# Patient Record
Sex: FEMALE | Race: WHITE | Employment: UNEMPLOYED | URBAN - METROPOLITAN AREA
[De-identification: names, ages, dates, MRNs, and addresses within clinical notes are randomized per-mention and may not be internally consistent; named-entity substitution may affect disease eponyms.]

---

## 2018-05-01 LAB
ANTIBODY SCREEN, EXTERNAL: NEGATIVE
CHLAMYDIA, EXTERNAL: NEGATIVE
HBSAG, EXTERNAL: NEGATIVE
HCT, EXTERNAL: 38.7
HGB, EXTERNAL: 12.7
HIV, EXTERNAL: NEGATIVE
N. GONORRHEA, EXTERNAL: NEGATIVE
PLATELET CNT,   EXTERNAL: 275
RUBELLA, EXTERNAL: NORMAL
T. PALLIDUM, EXTERNAL: NEGATIVE
TYPE, ABO & RH, EXTERNAL: NORMAL
URINALYSIS, EXTERNAL: NEGATIVE

## 2018-05-29 ENCOUNTER — DOCUMENTATION ONLY (OUTPATIENT)
Dept: OBGYN CLINIC | Age: 23
End: 2018-05-29

## 2018-05-29 ENCOUNTER — OFFICE VISIT (OUTPATIENT)
Dept: OBGYN CLINIC | Age: 23
End: 2018-05-29

## 2018-05-29 VITALS
BODY MASS INDEX: 21.16 KG/M2 | SYSTOLIC BLOOD PRESSURE: 118 MMHG | WEIGHT: 134.8 LBS | RESPIRATION RATE: 16 BRPM | HEIGHT: 67 IN | DIASTOLIC BLOOD PRESSURE: 66 MMHG | HEART RATE: 97 BPM

## 2018-05-29 DIAGNOSIS — Z34.90 PREGNANCY, UNSPECIFIED GESTATIONAL AGE: Primary | ICD-10-CM

## 2018-05-29 DIAGNOSIS — N92.6 MISSED MENSES: ICD-10-CM

## 2018-05-29 LAB
PAP SMEAR, EXTERNAL: NORMAL
URINALYSIS, EXTERNAL: NEGATIVE

## 2018-05-29 NOTE — PROGRESS NOTES
Current pregnancy history:    Kenroy Villalobos is a ,  25 y.o. female [facility] Patient's last menstrual period was 2018. .  She presents for the evaluation of amenorrhea and a positive pregnancy test.  Had single OB visit at outside office. Records reviewed:  Single, EOB visit 18. LMP=3/4/18 -> SHENA=18 **  US (18) 7+4 -> SHENA=18     O NEGATIVE  Nl hgb frac (AA)  CF drawn but do not see results **  Vit D=10.3**  Declines genetic screening  Do not see pap   Do not see pelvic exam documented  [pt states did not have pelvic exam done]    LMP history:  The date of her LMP is  certain. Her last menstrual period was normal and lasted for 4 to 5 days. A urine pregnancy test was positive unknown exact date it was in April. She was not on the pill at conception. Stopped in October. First period off OCP was a little late (5-6wks), have been regular since then q4wks. Based on her LMP, her EDC is 19 and her EGA is 12 weeks,2  days. Her menstrual cycles are regular and occur approximately every 28 days  and range from 3 to 5 days. The last menses lasted  the usual number of days. Ultrasound data:  She had an  ultrasound done at outside office done 18 which revealed a viable brink pregnancy with a gestational age of 9 weeks and 4 days giving an [address] of 18. (records in media tab)    Pregnancy symptoms:    Since her LMP she has experienced  urinary frequency, breast tenderness, and nausea. She HAS been vomiting over the last few weeks. Associated signs and symptoms which she denies: dysuria, discharge, vaginal bleeding. She states she has lost 1-2 pounds over last few wks, hasn't had much appetite. Relevant past pregnancy history:   She has the following pregnancy history: Her last pregnancy was uncomplicated. She has no history of  delivery. Relevant past medical history:(relevant to this pregnancy): noncontributory.        Pap/Occupational history:  Last pap smear: last year Results: Normal      Her occupation is: [employer] . Substance history: negative for alcohol, tobacco and street drugs. Positive for nothing. Exposure history: There is/are no indoor cat/s in the home. The patient was instructed to not change the cat litter. She admits close contact with children on a regular basis. She has had chicken pox or the vaccine in the past.   Patient denies issues with domestic violence. Genetic Screening/Teratology Counseling: (Includes patient, baby's father, or anyone in either family with:)  3.  Patient's age >/= 28 at [facility]?-- no  .   2. Thalassemia ([facility] [place], [address], or  background): MCV<80?--no.     3.  Neural tube defect (meningomyelocele, spina bifida, anencephaly)?--no.   4.  Congenital heart defect?--no.  5.  Down syndrome?--no.   6.  Kenny-Sachs (Spiritism, [place])?--no.   7.  Canavan's Disease?--no.   8.  Familial Dysautonomia?--no.   9.  Sickle cell disease or trait ()? --no   The patient has not been tested for sickle trait  10. Hemophilia or other blood disorders?--no. 11.  Muscular dystrophy?--no. 12.  Cystic fibrosis?--no. 13.  Hart's Chorea?--no. 14.  Mental retardation/autism (if yes was person tested for Fragile X)?--no. 15.  Other inherited genetic or chromosomal disorder?--no. 12.  Maternal metabolic disorder (DM, PKU, etc)?--no. 17.  Patient or FOB with a child with a birth defect not listed above?--no.  17a. Patient or FOB with a birth defect themselves?--no. 18.  Recurrent pregnancy loss, or stillbirth?--no. 19.  Any medications since LMP other than prenatal vitamins (include vitamins, supplements, OTC meds, drugs, alcohol)?--no. 20.  Any other genetic/environmental exposure to discuss?--no. Pt is [place]. FOB is . Infection History:  1.   Lives with someone with TB or TB exposed?--no.   2.  Patient or partner has history of genital herpes?--no.  3.  Rash or viral illness since LMP?--no.    4.  History of STD (GC, CT, HPV, syphilis, HIV)? --no   5. Other: OTHER? Past Medical History:   Diagnosis Date    Vitamin D deficiency 2018     History reviewed. No pertinent surgical history. Social History     Occupational History    Not on file. Social History Main Topics    Smoking status: Never Smoker    Smokeless tobacco: Never Used    Alcohol use No    Drug use: No    Sexual activity: Yes     Partners: Male     Family History   Problem Relation Age of Onset    No Known Problems Mother     No Known Problems Father     Breast Cancer Maternal Grandmother      OB History    Para Term  AB Living   1 0       SAB TAB Ectopic Molar Multiple Live Births              # Outcome Date GA Lbr Pola/2nd Weight Sex Delivery Anes PTL Lv   1 Current                 No Known Allergies  Prior to Admission medications    Medication Sig Start Date End Date Taking? Authorizing Provider   PNV MR.Maria Fernanda/JACLYN fum/folic ac (PRENATAL PO) Take  by mouth.    Yes Historical Provider        Review of Systems: History obtained from the patient  Constitutional: negative for weight loss, fever, night sweats  HEENT: negative for hearing loss, earache, congestion, snoring, sore throat  CV: negative for chest pain, palpitations, edema  Resp: negative for cough, shortness of breath, wheezing  Breast: negative for breast lumps, nipple discharge, galactorrhea  GI: negative for change in bowel habits, abdominal pain, black or bloody stools  : negative for frequency, dysuria, hematuria, vaginal discharge  MSK: negative for back pain, joint pain, muscle pain  Skin: negative for itching, rash, hives  Neuro: negative for dizziness, headache, confusion, weakness  Psych: negative for anxiety, depression, change in mood  Heme/lymph: negative for bleeding, bruising, pallor    Objective:  Visit Vitals    /66    Pulse 97    Resp 16    Ht 5' 7\" (1.702 m)    Wt 134 lb 12.8 oz (61.1 kg)    LMP 03/04/2018    BMI 21.11 kg/m2       Physical Exam:     Constitutional  · Appearance: well-nourished, well developed, alert, in no acute distress    HENT  · Head  · Face: appears normal  · Eyes: appear normal  · Ears: normal  · Mouth: normal  · Lips: no lesions    Neck  · Inspection/Palpation: normal appearance, no masses or tenderness  · Lymph Nodes: no lymphadenopathy present  · Thyroid: gland size normal, nontender, no nodules or masses present on palpation    Chest  · Respiratory Effort: breathing unlabored  · Auscultation: normal breath sounds    Cardiovascular  · Heart:  · Auscultation: regular rate and rhythm without murmur    Breasts  · Inspection of Breasts: breasts symmetrical, no skin changes, no discharge present, nipple appearance normal, no skin retraction present  · Palpation of Breasts and Axillae: no masses present on palpation, no breast tenderness  · Axillary Lymph Nodes: no lymphadenopathy present    Gastrointestinal  · Abdominal Examination: abdomen non-tender to palpation, normal bowel sounds, no masses present  · Liver and spleen: no hepatomegaly present, spleen not palpable  · Hernias: no hernias identified    Genitourinary  · External Genitalia: normal appearance for age, no discharge present, no tenderness present, no inflammatory lesions present, no masses present, no atrophy present  · Vagina: normal vaginal vault without central or paravaginal defects, no discharge present, no inflammatory lesions present, no masses present  · Bladder: non-tender to palpation  · Urethra: appears normal  · Cervix: normal   · Uterus: enlarged 12-14wks, normal shape, soft  · Adnexa: no adnexal tenderness present, no adnexal masses present  · Perineum: perineum within normal limits, no evidence of trauma, no rashes or skin lesions present  · Anus: anus within normal limits, no hemorrhoids present  · Inguinal Lymph Nodes: no lymphadenopathy present    Skin  · General Inspection: no rash, no lesions identified    Neurologic/Psychiatric  · Mental Status:  · Orientation: grossly oriented to person, place and time  · Mood and Affect: mood normal, affect appropriate    Assessment:   Intrauterine pregnancy:  - considering QS, cfDNA -> will let me know next visit  - declines carrier screening, however CF drawn at previous visit at outside office. No results included in records we rec'd. Will contact office to request.  - RTO 4wks    Plan:     · Offered CF testing,  Nuchal Translucency, MSAFP, amnio, and discussed NIPT  · Course of pregnancy discussed including visit schedule, routine U/S, glucola testing, etc.  · Avoid alcoholic beverages and illicit/recreational drugs use  · Take prenatal vitamins or folic acid daily. · Hospital and practice style discussed with coverage system. · Discussed nutrition, toxoplasmosis precautions, sexual activity, exercise, need for influenza vaccine, environmental and work hazards, travel advice, screen for domestic violence, need for seat belts. · Discussed seafood, unpasteurized dairy products, deli meat, artificial sweeteners, and caffeine. · Information on prenatal classes/breastfeeding given. · Patient encouraged not to smoke. · Discussed current prescription drug use. Given medication list.  · Discussed the use of over the counter medications and chemicals. .  · Pt understands risk of hemorrhage during pregnancy and post delivery and would accept blood products if necessary in life-threatening emergencies    Handouts given to pt. Orders Placed This Encounter    CULTURE, URINE    PNV JZ.00/KZALRDR fum/folic ac (PRENATAL PO)     Sig: Take  by mouth.  PAP, IG, RFX HPV ASCUS (250607)     Order Specific Question:   Pap Source? Answer:   Cervical     Order Specific Question:   Total Hysterectomy? Answer:   No     Order Specific Question:   Supracervical Hysterectomy?      Answer:   No     Order Specific Question:   Post Menopausal? Answer:   No     Order Specific Question:   Hormone Therapy? Answer:   No     Order Specific Question:   IUD? Answer:   No     Order Specific Question:   Abnormal Bleeding? Answer:   No     Order Specific Question:   Pregnant     Answer:   Yes     Order Specific Question:   Post Partum? Answer:    No

## 2018-05-29 NOTE — LETTER
To Whom it may concern: 
 
 
Tammi Spaulding is under my care for pregnancy. She was seen in the office today 5/29/2018. Following this visit she may return to work with the following restrictions: 
 
Due to her pregnancy she needs to be able to sit down for brief periods of rest. 
 
 
 
 
Respectfully, Sony Funk MD

## 2018-05-29 NOTE — PROGRESS NOTES
OB records reviewed:    Single, EOB visit 5/1/18.   LMP=3/4/18 -> SHENA=12/9/18 **  US (5/1/18) 7+4 -> SHENA=12/14/18    O NEGATIVE  Nl hgb frac (AA)  CF drawn but do not see results **  Vit D=10.3**  Declines genetic screening  Do not see pap   Do not see pelvic exam documented

## 2018-05-29 NOTE — MR AVS SNAPSHOT
[address] [facility] [address] [phone] Patient: Kenroy Villalobos MRN: [id number] Burgess Romero Visit Information Date & Time Provider Department Dept. Phone Encounter #  
 5/29/2018  9:00 AM Shana Garcia, Lauryn Frost [phone] [id number] Upcoming Health Maintenance Date Due  
 HPV Age 9Y-34Y (1 of 1 - Female 3 Dose Series) 8/25/2007 PAP AKA CERVICAL CYTOLOGY 8/25/2017 Influenza Age 5 to Adult 8/1/2018 Allergies as of 5/29/2018  Review Complete On: 5/29/2018 By: Luisa Ward LPN No Known Allergies Current Immunizations  Never Reviewed No immunizations on file. Not reviewed this visit Vitals BP Pulse Resp Height(growth percentile) Weight(growth percentile) BMI  
 118/66 97 16 5' 7\" (1.702 m) 134 lb 12.8 oz (61.1 kg) 21.11 kg/m2 OB Status Smoking Status Pregnant Never Smoker BMI and BSA Data Body Mass Index Body Surface Area  
 21.11 kg/m 2 1.7 m 2 Your Updated Medication List  
  
   
This list is accurate as of 5/29/18  9:36 AM.  Always use your most recent med list.  
  
  
  
  
 PRENATAL PO Take  by mouth. Patient Instructions Weeks 10 to 14 of Your Pregnancy: Care Instructions Your Care Instructions By weeks 10 to 15 of your pregnancy, the placenta has formed inside your uterus. It is possible to hear your baby's heartbeat with a special ultrasound device. Your baby's eyes can and do move. The arms and legs can bend. This is a good time to think about testing for birth defects. There are two types of tests: screening and diagnostic. Screening tests show the chance that a baby has a certain birth defect. They can't tell you for sure that your baby has a problem. Diagnostic tests show if a baby has a certain birth defect. It's your choice whether to have these tests. You and your partner can talk to your doctor or midwife about birth defects tests. Follow-up care is a key part of your treatment and safety. Be sure to make and go to all appointments, and call your doctor if you are having problems. It's also a good idea to know your test results and keep a list of the medicines you take. How can you care for yourself at home? Decide about tests · You can have screening tests and diagnostic tests to check for birth defects. The decision to have a test for birth defects is personal. Think about your age, your chance of passing on a family disease, your need to know about any problems, and what you might do after you have the test results. ¨ Triple or quadruple (quad) blood tests. These screening tests can be done between 15 and 20 weeks of pregnancy. They check the amounts of three or four substances in your blood. The doctor looks at these test results, along with your age and other factors, to find out the chance that your baby may have certain problems. ¨ Amniocentesis. This diagnostic test is used to look for chromosomal problems in the baby's cells. It can be done between 15 and 20 weeks of pregnancy, usually around week 16. 
¨ Nuchal translucency test. This test uses ultrasound to measure the thickness of the area at the back of the baby's neck. An increase in the thickness can be an early sign of Down syndrome. ¨ Chorionic villus sampling (CVS). This is a test that looks for certain genetic problems with your baby. The same genes that are in your baby are in the placenta. A small piece of the placenta is taken out and tested. This test is done when you are 10 to 13 weeks pregnant. Ease discomfort · Slow down and take naps when you feel tired. · If your emotions swing, talk to someone. Crying, anxiety, and concentration problems are common. · If your gums bleed, try a softer toothbrush.  If your gums are puffy and bleed a lot, see your dentist. 
· If you feel dizzy: ¨ Get up slowly after sitting or lying down. ¨ Drink plenty of fluids. ¨ Eat small snacks to keep your blood sugar stable. ¨ Put your head between your legs as though you were tying your shoelaces. ¨ Lie down with your legs higher than your head. Use pillows to prop up your feet. · If you have a headache: 
¨ Lie down. ¨ Ask your partner or a good friend for a neck massage. ¨ Try cool cloths over your forehead or across the back of your neck. ¨ Use acetaminophen (Tylenol) for pain relief. Do not use nonsteroidal anti-inflammatory drugs (NSAIDs), such as ibuprofen (Advil, Motrin) or naproxen (Aleve), unless your doctor says it is okay. · If you have a nosebleed, pinch your nose gently, and hold it for a short while. To prevent nosebleeds, try massaging a small dab of petroleum jelly, such as Vaseline, in your nostrils. · If your nose is stuffed up, try saline (saltwater) nose sprays. Do not use decongestant sprays. Care for your breasts · Wear a bra that gives you good support. · Know that changes in your breasts are normal. 
¨ Your breasts may get larger and more tender. Tenderness usually gets better by 12 weeks. ¨ Your nipples may get darker and larger, and small bumps around your nipples may show more. ¨ The veins in your chest and breasts may show more. · Don't worry about \"toughening'\" your nipples. Breastfeeding will naturally do this. Where can you learn more? Go to http://reji-yuan.info/. Enter W502 in the search box to learn more about \"Weeks 10 to 14 of Your Pregnancy: Care Instructions. \" Current as of: March 16, 2017 Content Version: 11.4 © 9563-5159 [employer]. Care instructions adapted under license by [employer] (which disclaims liability or warranty for this information).  If you have questions about a medical condition or this instruction, always ask your healthcare professional. [address]  any warranty or liability for your use of this information. Introducing \[facility]\"" & HEALTH SERVICES! [facility] introduces [employer] patient portal. Now you can access parts of your medical record, email your doctor's office, and request medication refills online. 1. In your internet browser, go to [email or web address] 2. Click on the First Time User? Click Here link in the Sign In box. You will see the New Member Sign Up page. 3. Enter your [employer] Access Code exactly as it appears below. You will not need to use this code after youve completed the sign-up process. If you do not sign up before the expiration date, you must request a new code. · [employer] Access Code: [email or web address] Expires: 8/27/2018  9:36 AM 
 
4. Enter the last four digits of your Social Security Number (xxxx) and Date of Birth (mm/dd/yyyy) as indicated and click Submit. You will be taken to the next sign-up page. 5. Create a [employer] ID. This will be your [employer] login ID and cannot be changed, so think of one that is secure and easy to remember. 6. Create a [employer] password. You can change your password at any time. 7. Enter your Password Reset Question and Answer. This can be used at a later time if you forget your password. 8. Enter your e-mail address. You will receive e-mail notification when new information is available in [address] 9. Click Sign Up. You can now view and download portions of your medical record. 10. Click the Download Summary menu link to download a portable copy of your medical information. If you have questions, please visit the Frequently Asked Questions section of the [employer] website. Remember, [employer] is NOT to be used for urgent needs. For medical emergencies, dial 911. Now available from your iPhone and Android! Please provide this summary of care documentation to your next provider. If you have any questions after today's visit, please call [phone].

## 2018-05-29 NOTE — PATIENT INSTRUCTIONS

## 2018-05-30 LAB — BACTERIA UR CULT: NORMAL

## 2018-05-31 LAB
CYTOLOGIST CVX/VAG CYTO: NORMAL
CYTOLOGY CVX/VAG DOC THIN PREP: NORMAL
DX ICD CODE: NORMAL
LABCORP, 190119: NORMAL
Lab: NORMAL
OTHER STN SPEC: NORMAL
PATH REPORT.FINAL DX SPEC: NORMAL
STAT OF ADQ CVX/VAG CYTO-IMP: NORMAL

## 2018-06-24 ENCOUNTER — DOCUMENTATION ONLY (OUTPATIENT)
Dept: OBGYN CLINIC | Age: 23
End: 2018-06-24

## 2018-06-24 NOTE — PROGRESS NOTES
Faxed over a request for patient's cystic fibrosis results to [facility] at [phone] (phone [phone]).

## 2018-06-25 PROBLEM — Z34.90 PREGNANCY: Status: ACTIVE | Noted: 2018-06-25

## 2018-06-26 ENCOUNTER — TELEPHONE (OUTPATIENT)
Dept: OBGYN CLINIC | Age: 23
End: 2018-06-26

## 2018-06-26 ENCOUNTER — ROUTINE PRENATAL (OUTPATIENT)
Dept: OBGYN CLINIC | Age: 23
End: 2018-06-26

## 2018-06-26 VITALS
DIASTOLIC BLOOD PRESSURE: 68 MMHG | HEART RATE: 101 BPM | BODY MASS INDEX: 21.19 KG/M2 | HEIGHT: 67 IN | WEIGHT: 135 LBS | SYSTOLIC BLOOD PRESSURE: 123 MMHG

## 2018-06-26 DIAGNOSIS — Z34.02 ENCOUNTER FOR SUPERVISION OF NORMAL FIRST PREGNANCY IN SECOND TRIMESTER: Primary | ICD-10-CM

## 2018-06-26 DIAGNOSIS — E55.9 VITAMIN D DEFICIENCY: ICD-10-CM

## 2018-06-26 NOTE — PROGRESS NOTES
bilat lat hip pain - better with Tylenol, stretching. ?flutters. Declines aneuploidy/AFP. Adv to incr Vit D to 2000iu daily, recheck @ 28wks. Re-request CF results. RTO 4wks with US. - transfer OB @ 12wks  - U=D -> SHENA=12/9/18  - considering QS, cfDNA -> will let me know next visit  - declines carrier screening, however CF drawn at previous visit at outside office. No results included in records we rec'd.  Will contact office to request.  - Rh NEGATIVE -> Rhogam @ 28wks ** ___  - Vit D def (10.3) -> recheck with 1hr ** ___

## 2018-06-26 NOTE — PATIENT INSTRUCTIONS
Belly Pain in Pregnancy: Care Instructions  Your Care Instructions    When you're pregnant, any belly pain can be a worry. You may not want to call your doctor about every pain you have. But you don't want to miss something that is dangerous for you or your baby. Even if it feels familiar, belly pain can mean something new when you're pregnant. It's important to know when to call your doctor. It will also help to know how to care for yourself at home when your pain is not caused by anything harmful. · When belly pain is more severe or constant, see a doctor right away. · If you're sure your belly pain is a sign of labor, call your doctor. · When belly pain is brief, it's usually a normal part of pregnancy. It might be related to changes in the growing uterus. Or it could be the stretching of ligaments called round ligaments. These ligaments help support the uterus. Round ligament pain can be on either side of your belly. It can also be felt in your hips or groin. Follow-up care is a key part of your treatment and safety. Be sure to make and go to all appointments, and call your doctor if you are having problems. It's also a good idea to know your test results and keep a list of the medicines you take. How can you tell if belly pain is a sign of labor? When belly pain is caused by labor, it can feel like mild or menstrual-like cramps in your lower belly. These cramps are probably contractions. They can happen in your second or third trimester. You may also have:  · A steady, dull ache in your lower back, pelvis, or thighs. · A feeling of pressure in your pelvis or lower belly. · Changes in your vaginal discharge or a sudden release of fluid from the vagina. If you think you are in labor, call your doctor. How can you care for yourself at home? When belly pain is mild and is not a symptom of labor:  · Rest until you feel better. · Take a warm bath.   · Think about what you drink and eat:  ¨ Drink plenty of fluids. Choose water and other caffeine-free clear liquids until you feel better. ¨ Try eating small, frequent meals. If your stomach is upset, try bland, low-fat foods like plain rice, broiled chicken, toast, and yogurt. · Think about how you move if you are having brief pains from stretching of the round ligaments. ¨ Try gentle stretching. ¨ Move a little more slowly when turning in bed or getting up from a chair, so those ligaments don't stretch quickly. ¨ Lean forward a bit if you think you are going to cough or sneeze. When should you call for help? Call 911 anytime you think you may need emergency care. For example, call if:  ? · You have sudden, severe pain in your belly. ? · You have severe vaginal bleeding. ?Call your doctor now or seek immediate medical care if:  ? · You have new or worse belly pain or cramping. ? · You have any vaginal bleeding. ? · You have a fever. ? · You have symptoms of preeclampsia, such as:  ¨ Sudden swelling of your face, hands, or feet. ¨ New vision problems (such as dimness or blurring). ¨ A severe headache. ? · You think that you may be in labor. This means that you've had at least 8 contractions within 1 hour or at least 4 contractions within 20 minutes, even after you change your position and drink fluids. ? · You have symptoms of a urinary tract infection. These may include:  ¨ Pain or burning when you urinate. ¨ A frequent need to urinate without being able to pass much urine. ¨ Pain in the flank, which is just below the rib cage and above the waist on either side of the back. ¨ Blood in your urine. ? Watch closely for changes in your health, and be sure to contact your doctor if you are worried about your or your baby's health. Where can you learn more? Go to http://reji-yuan.info/. Enter 113 [id number] [id number] in the search box to learn more about \"Belly Pain in Pregnancy: Care Instructions. \"  Current as of: March 16, 2017  Content Version: 11.4  © 8247-3568 [employer]. Care instructions adapted under license by [employer] (which disclaims liability or warranty for this information). If you have questions about a medical condition or this instruction, always ask your healthcare professional. [address] any warranty or liability for your use of this information. Weeks 14 to 18 of Your Pregnancy: Care Instructions  Your Care Instructions    During this time, you may start to \"show,\" so that you look pregnant to people around you. You may also notice some changes in your skin, such as itchy spots on your palms or acne on your face. Your baby is now able to pass urine, and your baby's first stool (meconium) is starting to collect in his or her intestines. Hair is also beginning to grow on your baby's head. At your next visit, between weeks 18 and 20, your doctor may do an ultrasound test. The test allows your doctor to check for certain problems. Your doctor can also tell the sex of your baby. This is a good time to think about whether you want to know whether your baby is a boy or a girl. Talk to your doctor about getting a flu shot to help keep you healthy during your pregnancy. As your pregnancy moves along, it is common to worry or feel anxious. Your body is changing a lot. And you are thinking about giving birth, the health of your baby, and becoming a parent. You can learn to cope with any anxiety and stress you feel. Follow-up care is a key part of your treatment and safety. Be sure to make and go to all appointments, and call your doctor if you are having problems. It's also a good idea to know your test results and keep a list of the medicines you take. How can you care for yourself at home? ?Reduce stress  ? · Ask for help with cooking and housekeeping. ? · Figure out who or what causes your stress. Avoid these people or situations as much as possible.    ? · Relax every day. Taking 10- to 15-minute breaks can make a big difference. Take a walk, listen to music, or take a warm bath. ? · Learn relaxation techniques at prenatal or yoga class. Or buy a relaxation tape. ? · List your fears about having a baby and becoming a parent. Share the list with someone you trust. Decide which worries are really small, and try to let them go. Exercise  ? · If you did not exercise much before pregnancy, start slowly. Walking is best. Daya  yourself, and do a little more every day. ? · Brisk walking, easy jogging, low-impact aerobics, water aerobics, and yoga are good choices. Some sports, such as scuba diving, horseback riding, downhill skiing, gymnastics, and water skiing, are not a good idea. ? · Try to do at least 2½ hours a week of moderate exercise, such as a fast walk. One way to do this is to be active 30 minutes a day, at least 5 days a week. It's fine to be active in blocks of 10 minutes or more throughout your day and week. ? · Wear loose clothing. And wear shoes and a bra that provide good support. ? · Warm up and cool down to start and finish your exercise. ? · If you want to use weights, be sure to use light weights. They reduce stress on your joints. ?Stay at the best weight for you  ? · Experts recommend that you gain about 1 pound a month during the first 3 months of your pregnancy. ? · Experts recommend that you gain about 1 pound a week during your last 6 months of pregnancy, for a total weight gain of 25 to 35 pounds. ? · If you are underweight, you will need to gain more weight (about 28 to 40 pounds). ? · If you are overweight, you may not need to gain as much weight (about 15 to 25 pounds). ? · If you are gaining weight too fast, use common sense. Exercise every day, and limit sweets, fast foods, and fats. Choose lean meats, fruits, and vegetables. ? · If you are having twins or more, your doctor may refer you to a dietitian.    Where can you learn more? Go to http://reji-yuan.info/. Enter L874 in the search box to learn more about \"Weeks 14 to 18 of Your Pregnancy: Care Instructions. \"  Current as of: March 16, 2017  Content Version: 11.4  © 8454-7500 [employer], [employer]. Care instructions adapted under license by [employer] (which disclaims liability or warranty for this information). If you have questions about a medical condition or this instruction, always ask your healthcare professional. [address] any warranty or liability for your use of this information.

## 2018-06-26 NOTE — TELEPHONE ENCOUNTER
Called [facility] for patient's CF results.  took down my information and will give to nurse.

## 2018-07-24 ENCOUNTER — ROUTINE PRENATAL (OUTPATIENT)
Dept: OBGYN CLINIC | Age: 23
End: 2018-07-24

## 2018-07-24 VITALS
BODY MASS INDEX: 22.13 KG/M2 | DIASTOLIC BLOOD PRESSURE: 70 MMHG | HEIGHT: 67 IN | SYSTOLIC BLOOD PRESSURE: 139 MMHG | WEIGHT: 141 LBS

## 2018-07-24 DIAGNOSIS — Z34.02 ENCOUNTER FOR SUPERVISION OF NORMAL FIRST PREGNANCY IN SECOND TRIMESTER: Primary | ICD-10-CM

## 2018-07-24 NOTE — PROGRESS NOTES
FETAL SURVEY  A SINGLE VIABLE IUP AT 20W2D GA BY LMP. FETAL CARDIAC MOTION OBSERVED. FETAL ANATOMY WELL VISUALIZED AND APPEARS WITHIN NORMAL LIMITS. NO ABNORMALITIES IDENTIFIED ON TODAYS EXAM.  APPROPRIATE GROWTH MEASURED; SIZE = DATES. GHADA AND CERVIX APPEAR WITHIN NORMAL LIMITS. PLACENTA APPEARS POSTERIOR LOW-LYING AND MEASURES 1.5-1.8CM FROM THE CERVICAL OS. GENDER: XY    Some back pain - better with Tylenol  Dizzy with prolonged standing, no SOB/CP. US today: nl morph, post low-lying (1.5-1.8cm). Rerequest CF. RTO 4wk. - transfer OB @ 12wks  - U=D -> SHENA=12/9/18  - declines aneuploidy/carrier/AFP   - rerequest  (6/26)CF from prev [facility], re-request (7/24)**  - Rh NEGATIVE -> Rhogam @ 28wks ** ___  - Vit D def (10.3) -> adv to incr to 2000iu daily -> recheck with 1hr ** ___  - US (7/24/18) 19+4 @ 20+2. Nl morph.  Low-lying post placenta (1.5-1.8cm from os)

## 2018-07-24 NOTE — PATIENT INSTRUCTIONS
Learning About When to Call Your Doctor During Pregnancy (After 20 Weeks)  Your Care Instructions  It's common to have concerns about what might be a problem during pregnancy. Although most pregnant women don't have any serious problems, it's important to know when to call your doctor if you have certain symptoms or signs of labor. These are general suggestions. Your doctor may give you some more information about when to call. When to call your doctor (after 20 weeks)  Call 911 anytime you think you may need emergency care. For example, call if:  · You have severe vaginal bleeding. · You have sudden, severe pain in your belly. · You passed out (lost consciousness). · You have a seizure. · You see or feel the umbilical cord. · You think you are about to deliver your baby and can't make it safely to the hospital.  Call your doctor now or seek immediate medical care if:  · You have vaginal bleeding. · You have belly pain. · You have a fever. · You have symptoms of preeclampsia, such as:  ¨ Sudden swelling of your face, hands, or feet. ¨ New vision problems (such as dimness or blurring). ¨ A severe headache. · You have a sudden release of fluid from your vagina. (You think your water broke.)  · You think that you may be in labor. This means that you've had at least 4 contractions within 20 minutes or at least 8 contractions in an hour. · You notice that your baby has stopped moving or is moving much less than normal.  · You have symptoms of a urinary tract infection. These may include:  ¨ Pain or burning when you urinate. ¨ A frequent need to urinate without being able to pass much urine. ¨ Pain in the flank, which is just below the rib cage and above the waist on either side of the back. ¨ Blood in your urine. Watch closely for changes in your health, and be sure to contact your doctor if:  · You have vaginal discharge that smells bad.   · You have skin changes, such as:  ¨ A rash.  ¨ Itching. ¨ Yellow color to your skin. · You have other concerns about your pregnancy. If you have labor signs at 37 weeks or more  If you have signs of labor at 37 weeks or more, your doctor may tell you to call when your labor becomes more active. Symptoms of active labor include:  · Contractions that are regular. · Contractions that are less than 5 minutes apart. · Contractions that are hard to talk through. Follow-up care is a key part of your treatment and safety. Be sure to make and go to all appointments, and call your doctor if you are having problems. It's also a good idea to know your test results and keep a list of the medicines you take. Where can you learn more? Go to http://reji-yuan.info/. Enter  in the search box to learn more about \"Learning About When to Call Your Doctor During Pregnancy (After 20 Weeks). \"  Current as of: November 21, 2017  Content Version: 11.7  © 8448-7953 [employer]. Care instructions adapted under license by [employer] (which disclaims liability or warranty for this information). If you have questions about a medical condition or this instruction, always ask your healthcare professional. [place] [address] any warranty or liability for your use of this information.

## 2018-08-10 NOTE — TELEPHONE ENCOUNTER
Called office again for copy of CF. JOHN w/. She will notify the medical records person, since she was at lunch.

## 2018-08-27 ENCOUNTER — ROUTINE PRENATAL (OUTPATIENT)
Dept: OBGYN CLINIC | Age: 23
End: 2018-08-27

## 2018-08-27 VITALS — DIASTOLIC BLOOD PRESSURE: 78 MMHG | SYSTOLIC BLOOD PRESSURE: 112 MMHG | WEIGHT: 152 LBS | BODY MASS INDEX: 23.81 KG/M2

## 2018-08-27 DIAGNOSIS — O99.891 BACK PAIN AFFECTING PREGNANCY IN SECOND TRIMESTER: ICD-10-CM

## 2018-08-27 DIAGNOSIS — M54.9 BACK PAIN AFFECTING PREGNANCY IN SECOND TRIMESTER: ICD-10-CM

## 2018-08-27 DIAGNOSIS — Z34.02 ENCOUNTER FOR SUPERVISION OF NORMAL FIRST PREGNANCY IN SECOND TRIMESTER: Primary | ICD-10-CM

## 2018-08-27 NOTE — MR AVS SNAPSHOT
[address] [place] [address] 
[phone] Patient: Fanta Loco MRN: [id number] Miryam [place] Visit Information Date & Time Provider Department Dept. Phone Encounter #  
 2018 10:20 AM [address], Lauryn Frost [phone] [id number]  
  
 2018 10:20 AM  
OB VISIT with [address], [place]  
Nicho Nichols ([address]) Appt Note: 28w1d - 1hr GCT/CBC/ Vit. D/AB Screen/ Rhogam/  Consent today (MG)  
 [address] [facility] [address] [facility] [id number]  
[facility] 31 [address] Upcoming Health Maintenance Date Due  
 HPV Age 9Y-34Y (1 of 1 - Female 3 Dose Series) 2007 Influenza Age 5 to Adult 2018 PAP AKA CERVICAL CYTOLOGY 2021 Allergies as of 2018  Review Complete On: 2018 By: [address], [place]  
 No Known Allergies Current Immunizations  Never Reviewed No immunizations on file. Not reviewed this visit Vitals BP Weight(growth percentile) LMP BMI OB Status Smoking Status 112/78 152 lb (68.9 kg) 2018 23.81 kg/m2 Pregnant Never Smoker BMI and BSA Data Body Mass Index Body Surface Area  
 23.81 kg/m 2 1.8 m 2 Your Updated Medication List  
  
   
This list is accurate as of 18 10:46 AM.  Always use your most recent med list.  
  
  
  
  
 PRENATAL PO Take  by mouth. Patient Instructions Learning About Pregnancy Your Care Instructions Your health in the early weeks of your pregnancy is particularly important for your baby's health. Take good care of yourself. Anything you do that harms your body can also harm your baby. Make sure to go to all of your doctor appointments. Regular checkups will help keep you and your baby healthy. How can you care for yourself at home? Diet   · Eat a balanced diet. Make sure your diet includes plenty of beans, peas, and leafy green vegetables.  
  · Do not skip meals or go for many hours without eating. If you are nauseated, try to eat a small, healthy snack every 2 to 3 hours.  
  · Do not eat fish that has a high level of mercury, such as shark, swordfish, or mackerel. Do not eat more than one can of tuna each week.  
  · Drink plenty of fluids, enough so that your urine is light yellow or clear like water. If you have kidney, heart, or liver disease and have to limit fluids, talk with your doctor before you increase the amount of fluids you drink.  
  · Cut down on caffeine, such as coffee, tea, and cola.  
  · Do not drink alcohol, such as beer, wine, or hard liquor.  
  · Take a multivitamin that contains at least 400 micrograms (mcg) of folic acid to help prevent birth defects. Fortified cereal and whole wheat bread are good additional sources of folic acid.  
  · Increase the calcium in your diet. Try to drink a quart of skim milk each day. You may also take calcium supplements and choose foods such as cheese and yogurt.  
 Lifestyle 
  · Make sure you go to your follow-up appointments.  
  · Get plenty of rest. You may be unusually tired while you are pregnant.  
  · Get at least 30 minutes of exercise on most days of the week. Walking is a good choice. If you have not exercised in the past, start out slowly. Take several short walks each day.  
  · Do not smoke. If you need help quitting, talk to your doctor about stop-smoking programs. These can increase your chances of quitting for good.  
  · Do not touch cat feces or litter boxes. Also, wash your hands after you handle raw meat, and fully cook all meat before you eat it. Wear gloves when you work in the yard or garden, and wash your hands well when you are done.  Cat feces, raw or undercooked meat, and contaminated dirt can cause an infection that may harm your baby or lead to a miscarriage.  
  · Do not use saunas or hot tubs. Raising your body temperature may harm your baby.  
  · Avoid chemical fumes, paint fumes, or poisons.  
  · Do not use illegal drugs or alcohol. Medicines 
  · Review all of your medicines with your doctor. Some of your routine medicines may need to be changed to protect your baby.  
  · Use acetaminophen (Tylenol) to relieve minor problems, such as a mild headache or backache or a mild fever with cold symptoms. Do not use nonsteroidal anti-inflammatory drugs (NSAIDs), such as ibuprofen (Advil, Motrin) or naproxen (Aleve), unless your doctor says it is okay.  
  · Do not take two or more pain medicines at the same time unless the doctor told you to. Many pain medicines have acetaminophen, which is Tylenol. Too much acetaminophen (Tylenol) can be harmful.  
  · Take your medicines exactly as prescribed. Call your doctor if you think you are having a problem with your medicine.  
 To manage morning sickness 
  · If you feel sick when you first wake up, try eating a small snack (such as crackers) before you get out of bed. Allow some time to digest the snack, and then get out of bed slowly.  
  · Do not skip meals or go for long periods without eating. An empty stomach can make nausea worse.  
  · Eat small, frequent meals instead of three large meals each day.  
  · Drink plenty of fluids. Sports drinks, such as Gatorade or Powerade, are good choices.  
  · Eat foods that are high in protein but low in fat.  
  · If you are taking iron supplements, ask your doctor if they are necessary. Iron can make nausea worse.  
  · Avoid any smells, such as coffee, that make you feel sick.  
  · Get lots of rest. Morning sickness may be worse when you are tired. Follow-up care is a key part of your treatment and safety.  Be sure to make and go to all appointments, and call your doctor if you are having problems. It's also a good idea to know your test results and keep a list of the medicines you take. Where can you learn more? Go to http://reji-yuan.info/. Enter J328 in the search box to learn more about \"Learning About Pregnancy. \" Current as of: November 21, 2017 Content Version: 11.7 © 3206-9279 [employer]. Care instructions adapted under license by [employer] (which disclaims liability or warranty for this information). If you have questions about a medical condition or this instruction, always ask your healthcare professional. [address] any warranty or liability for your use of this information. Introducing [facility]! Dear Anthony Maharaj: Thank you for requesting a [employer] account. Our records indicate that you already have an active [employer] account. You can access your account anytime at [email or web address] Did you know that you can access your hospital and ER discharge instructions at any time in [employer]? You can also review all of your test results from your hospital stay or ER visit. Additional Information If you have questions, please visit the Frequently Asked Questions section of the [employer] website at [email or web address] Remember, [employer] is NOT to be used for urgent needs. For medical emergencies, dial 911. Now available from your iPhone and Android! Please provide this summary of care documentation to your next provider. If you have any questions after today's visit, please call [phone].

## 2018-08-27 NOTE — PROGRESS NOTES
+FM.  C/o back pain c/w sciatica -> PT (she will eran). RTO 3wks wit 1hr/CBC/Vit D/Rhogam.        - transfer OB @ 12wks  - U=D -> SHENA=12/9/18  - declines aneuploidy/carrier/AFP   - rerequest  (6/26)CF from prev offc, re-request (7/24)**  - Rh NEGATIVE -> Rhogam @ 28wks ** ___  - Vit D def (10.3) -> adv to incr to 2000iu daily -> recheck with 1hr ** ___  - US (7/24/18) 19+4 @ 20+2. Nl morph.  Low-lying post placenta (1.5-1.8cm from os) -> rpt US 28-32wkls ** ___

## 2018-08-27 NOTE — PATIENT INSTRUCTIONS
Learning About Pregnancy  Your Care Instructions    Your health in the early weeks of your pregnancy is particularly important for your baby's health. Take good care of yourself. Anything you do that harms your body can also harm your baby. Make sure to go to all of your doctor appointments. Regular checkups will help keep you and your baby healthy. How can you care for yourself at home? Diet    · Eat a balanced diet. Make sure your diet includes plenty of beans, peas, and leafy green vegetables.     · Do not skip meals or go for many hours without eating. If you are nauseated, try to eat a small, healthy snack every 2 to 3 hours.     · Do not eat fish that has a high level of mercury, such as shark, swordfish, or mackerel. Do not eat more than one can of tuna each week.     · Drink plenty of fluids, enough so that your urine is light yellow or clear like water. If you have kidney, heart, or liver disease and have to limit fluids, talk with your doctor before you increase the amount of fluids you drink.     · Cut down on caffeine, such as coffee, tea, and cola.     · Do not drink alcohol, such as beer, wine, or hard liquor.     · Take a multivitamin that contains at least 400 micrograms (mcg) of folic acid to help prevent birth defects. Fortified cereal and whole wheat bread are good additional sources of folic acid.     · Increase the calcium in your diet. Try to drink a quart of skim milk each day. You may also take calcium supplements and choose foods such as cheese and yogurt.    Lifestyle    · Make sure you go to your follow-up appointments.     · Get plenty of rest. You may be unusually tired while you are pregnant.     · Get at least 30 minutes of exercise on most days of the week. Walking is a good choice. If you have not exercised in the past, start out slowly. Take several short walks each day.     · Do not smoke. If you need help quitting, talk to your doctor about stop-smoking programs.  These can increase your chances of quitting for good.     · Do not touch cat feces or litter boxes. Also, wash your hands after you handle raw meat, and fully cook all meat before you eat it. Wear gloves when you work in the yard or garden, and wash your hands well when you are done. Cat feces, raw or undercooked meat, and contaminated dirt can cause an infection that may harm your baby or lead to a miscarriage.     · Do not use saunas or hot tubs. Raising your body temperature may harm your baby.     · Avoid chemical fumes, paint fumes, or poisons.     · Do not use illegal drugs or alcohol. Medicines    · Review all of your medicines with your doctor. Some of your routine medicines may need to be changed to protect your baby.     · Use acetaminophen (Tylenol) to relieve minor problems, such as a mild headache or backache or a mild fever with cold symptoms. Do not use nonsteroidal anti-inflammatory drugs (NSAIDs), such as ibuprofen (Advil, Motrin) or naproxen (Aleve), unless your doctor says it is okay.     · Do not take two or more pain medicines at the same time unless the doctor told you to. Many pain medicines have acetaminophen, which is Tylenol. Too much acetaminophen (Tylenol) can be harmful.     · Take your medicines exactly as prescribed. Call your doctor if you think you are having a problem with your medicine.    To manage morning sickness    · If you feel sick when you first wake up, try eating a small snack (such as crackers) before you get out of bed. Allow some time to digest the snack, and then get out of bed slowly.     · Do not skip meals or go for long periods without eating. An empty stomach can make nausea worse.     · Eat small, frequent meals instead of three large meals each day.     · Drink plenty of fluids.  Sports drinks, such as Gatorade or Powerade, are good choices.     · Eat foods that are high in protein but low in fat.     · If you are taking iron supplements, ask your doctor if they are necessary. Iron can make nausea worse.     · Avoid any smells, such as coffee, that make you feel sick.     · Get lots of rest. Morning sickness may be worse when you are tired. Follow-up care is a key part of your treatment and safety. Be sure to make and go to all appointments, and call your doctor if you are having problems. It's also a good idea to know your test results and keep a list of the medicines you take. Where can you learn more? Go to http://reji-yuan.info/. Enter J888 in the search box to learn more about \"Learning About Pregnancy. \"  Current as of: November 21, 2017  Content Version: 11.7  © 2898-0050 [employer]. Care instructions adapted under license by [employer] (which disclaims liability or warranty for this information). If you have questions about a medical condition or this instruction, always ask your healthcare professional. [address] any warranty or liability for your use of this information.

## 2018-09-17 ENCOUNTER — ROUTINE PRENATAL (OUTPATIENT)
Dept: OBGYN CLINIC | Age: 23
End: 2018-09-17

## 2018-09-17 VITALS
DIASTOLIC BLOOD PRESSURE: 72 MMHG | WEIGHT: 159 LBS | BODY MASS INDEX: 24.96 KG/M2 | HEART RATE: 102 BPM | SYSTOLIC BLOOD PRESSURE: 138 MMHG | HEIGHT: 67 IN

## 2018-09-17 DIAGNOSIS — Z29.13 NEED FOR RHOGAM DUE TO RH NEGATIVE MOTHER: ICD-10-CM

## 2018-09-17 DIAGNOSIS — Z34.03 ENCOUNTER FOR SUPERVISION OF NORMAL FIRST PREGNANCY IN THIRD TRIMESTER: Primary | ICD-10-CM

## 2018-09-17 DIAGNOSIS — R31.9 HEMATURIA, UNSPECIFIED TYPE: ICD-10-CM

## 2018-09-17 LAB
GTT, 1 HR, GLUCOLA, EXTERNAL: 109
HCT, EXTERNAL: 31.8
HGB, EXTERNAL: 10.4
PLATELET CNT,   EXTERNAL: 235

## 2018-09-17 NOTE — PROGRESS NOTES
After obtaining consent, and per orders of Dr. Mariaelena Moreno, injection of Rhogam given in right deltoid by Dontrell Rose LPN. Patient instructed to remain in clinic for 20 minutes afterwards, and to report any adverse reaction to me immediately.

## 2018-09-17 NOTE — PATIENT INSTRUCTIONS
[employer] Desk: [phone]       Weeks 26 to 30 of Your Pregnancy: Care Instructions  Your Care Instructions    You are now in your last trimester of pregnancy. Your baby is growing rapidly. And you'll probably feel your baby moving around more often. Your doctor may ask you to count your baby's kicks. Your back may ache as your body gets used to your baby's size and length. If you haven't already had the Tdap shot during this pregnancy, talk to your doctor about getting it. It will help protect your  against pertussis infection. During this time, it's important to take care of yourself and pay attention to what your body needs. If you feel sexual, explore ways to be close with your partner that match your comfort and desire. Use the tips provided in this care sheet to find ways to be sexual in your own way. Follow-up care is a key part of your treatment and safety. Be sure to make and go to all appointments, and call your doctor if you are having problems. It's also a good idea to know your test results and keep a list of the medicines you take. How can you care for yourself at home? Take it easy at work  · Take frequent breaks. If possible, stop working when you are tired, and rest during your lunch hour. · Take bathroom breaks every 2 hours. · Change positions often. If you sit for long periods, stand up and walk around. · When you stand for a long time, keep one foot on a low stool with your knee bent. After standing a lot, sit with your feet up. · Avoid fumes, chemicals, and tobacco smoke. Be sexual in your own way  · Having sex during pregnancy is okay, unless your doctor tells you not to. · You may be very interested in sex, or you may have no interest at all. · Your growing belly can make it hard to find a good position during intercourse. [place] and explore. · You may get cramps in your uterus when your partner touches your breasts.   · A back rub may relieve the backache or cramps that sometimes follow orgasm. Learn about  labor  · Watch for signs of  labor. You may be going into labor if:  ¨ You have menstrual-like cramps, with or without nausea. ¨ You have about 6 or more contractions in 1 hour, even after you have had a glass of water and are resting. ¨ You have a low, dull backache that does not go away when you change your position. ¨ You have pain or pressure in your pelvis that comes and goes in a pattern. ¨ You have intestinal cramping or flu-like symptoms, with or without diarrhea. ¨ You notice an increase or change in your vaginal discharge. Discharge may be heavy, mucus-like, watery, or streaked with blood. ¨ Your water breaks. · If you think you have  labor:  ¨ Drink 2 or 3 glasses of water or juice. Not drinking enough fluids can cause contractions. ¨ Stop what you are doing, and empty your bladder. Then lie down on your left side for at least 1 hour. ¨ While lying on your side, find your breast bone. Put your fingers in the soft spot just below it. Move your fingers down toward your belly button to find the top of your uterus. Check to see if it is tight. ¨ Contractions can be weak or strong. Record your contractions for an hour. Time a contraction from the start of one contraction to the start of the next one. ¨ Single or several strong contractions without a pattern are called Ernst-Ng contractions. They are practice contractions but not the start of labor. They often stop if you change what you are doing. ¨ Call your doctor if you have regular contractions. Where can you learn more? Go to http://reji-yuan.info/. Enter C595 in the search box to learn more about \"Weeks 26 to 30 of Your Pregnancy: Care Instructions. \"  Current as of: 2017  Content Version: 11.7  © 7017-6994 [employer].  Care instructions adapted under license by [email or web address] (which disclaims liability or warranty for this information). If you have questions about a medical condition or this instruction, always ask your healthcare professional. [place] [address] any warranty or liability for your use of this information.

## 2018-09-18 LAB
25(OH)D3+25(OH)D2 SERPL-MCNC: 31.8 NG/ML (ref 30–100)
BLD GP AB SCN SERPL QL: NEGATIVE
ERYTHROCYTE [DISTWIDTH] IN BLOOD BY AUTOMATED COUNT: 13.5 % (ref 12.3–15.4)
GLUCOSE 1H P 50 G GLC PO SERPL-MCNC: 109 MG/DL (ref 65–139)
HCT VFR BLD AUTO: 31.8 % (ref 34–46.6)
HGB BLD-MCNC: 10.4 G/DL (ref 11.1–15.9)
MCH RBC QN AUTO: 28.3 PG (ref 26.6–33)
MCHC RBC AUTO-ENTMCNC: 32.7 G/DL (ref 31.5–35.7)
MCV RBC AUTO: 86 FL (ref 79–97)
PLATELET # BLD AUTO: 235 X10E3/UL (ref 150–379)
RBC # BLD AUTO: 3.68 X10E6/UL (ref 3.77–5.28)
WBC # BLD AUTO: 10.4 X10E3/UL (ref 3.4–10.8)

## 2018-09-18 NOTE — PROGRESS NOTES
+FM. C/o bladder pressure, frequency. Tr bld in offc dip -> UA/C&S.  1hr/CBC/VitD/Rhogam today. Signed up for classes. RTO 2wks.

## 2018-09-19 LAB
APPEARANCE UR: ABNORMAL
BACTERIA #/AREA URNS HPF: ABNORMAL /[HPF]
BILIRUB UR QL STRIP: NEGATIVE
CASTS URNS MICRO: ABNORMAL
CASTS URNS QL MICRO: PRESENT /LPF
COLOR UR: YELLOW
EPI CELLS #/AREA URNS HPF: ABNORMAL /HPF
GLUCOSE UR QL: ABNORMAL
HGB UR QL STRIP: NEGATIVE
KETONES UR QL STRIP: NEGATIVE
LEUKOCYTE ESTERASE UR QL STRIP: ABNORMAL
MICRO URNS: ABNORMAL
MUCOUS THREADS URNS QL MICRO: PRESENT
NITRITE UR QL STRIP: NEGATIVE
PH UR STRIP: 5.5 [PH] (ref 5–7.5)
PROT UR QL STRIP: NEGATIVE
RBC #/AREA URNS HPF: ABNORMAL /HPF
SP GR UR: 1.02 (ref 1–1.03)
UROBILINOGEN UR STRIP-MCNC: 0.2 MG/DL (ref 0.2–1)
WBC #/AREA URNS HPF: ABNORMAL /HPF

## 2018-09-21 LAB — BACTERIA UR CULT: NORMAL

## 2018-09-22 ENCOUNTER — PATIENT MESSAGE (OUTPATIENT)
Dept: OBGYN CLINIC | Age: 23
End: 2018-09-22

## 2018-10-01 ENCOUNTER — ROUTINE PRENATAL (OUTPATIENT)
Dept: OBGYN CLINIC | Age: 23
End: 2018-10-01

## 2018-10-01 VITALS
HEIGHT: 67 IN | HEART RATE: 112 BPM | SYSTOLIC BLOOD PRESSURE: 128 MMHG | BODY MASS INDEX: 25.27 KG/M2 | WEIGHT: 161 LBS | DIASTOLIC BLOOD PRESSURE: 68 MMHG

## 2018-10-01 DIAGNOSIS — Z23 NEED FOR TDAP VACCINATION: Primary | ICD-10-CM

## 2018-10-01 DIAGNOSIS — Z23 ENCOUNTER FOR IMMUNIZATION: ICD-10-CM

## 2018-10-01 NOTE — PROGRESS NOTES
After obtaining consent, and per orders of Dr. Kaylen Marte, injection of the Influenza Vaccine given in left deltoid by Alessio Hernandez LPN. Patient instructed to remain in clinic for 20 minutes afterwards, and to report any adverse reaction to me immediately. Lot#: [id number]  Exp: 6/30/2019    After obtaining consent, and per orders of Dr. Kaylen Marte, injection of TDAP given in right deltoid by Alessio Hernandez LPN. Patient instructed to remain in clinic for 20 minutes afterwards, and to report any adverse reaction to me immediately.

## 2018-10-01 NOTE — PROGRESS NOTES
+FM. No c/o. Mild anemia -> started OTC Fe. Flu/TDAP today. Asking about OOW note @ 37wks - adv can only give note if medically nec. RTO 2wks with US for growth.

## 2018-10-15 ENCOUNTER — OFFICE VISIT (OUTPATIENT)
Dept: OBGYN CLINIC | Age: 23
End: 2018-10-15

## 2018-10-15 ENCOUNTER — ROUTINE PRENATAL (OUTPATIENT)
Dept: OBGYN CLINIC | Age: 23
End: 2018-10-15

## 2018-10-15 VITALS
SYSTOLIC BLOOD PRESSURE: 132 MMHG | WEIGHT: 165.8 LBS | BODY MASS INDEX: 26.02 KG/M2 | DIASTOLIC BLOOD PRESSURE: 73 MMHG | HEIGHT: 67 IN | RESPIRATION RATE: 16 BRPM

## 2018-10-15 DIAGNOSIS — Z34.03 ENCOUNTER FOR SUPERVISION OF NORMAL FIRST PREGNANCY IN THIRD TRIMESTER: Primary | ICD-10-CM

## 2018-10-29 ENCOUNTER — ROUTINE PRENATAL (OUTPATIENT)
Dept: OBGYN CLINIC | Age: 23
End: 2018-10-29

## 2018-10-29 VITALS
HEART RATE: 102 BPM | BODY MASS INDEX: 26.21 KG/M2 | DIASTOLIC BLOOD PRESSURE: 77 MMHG | HEIGHT: 67 IN | SYSTOLIC BLOOD PRESSURE: 107 MMHG | WEIGHT: 167 LBS

## 2018-10-29 DIAGNOSIS — Z34.03 ENCOUNTER FOR SUPERVISION OF NORMAL FIRST PREGNANCY IN THIRD TRIMESTER: Primary | ICD-10-CM

## 2018-10-29 DIAGNOSIS — D64.9 ANEMIA, UNSPECIFIED TYPE: ICD-10-CM

## 2018-10-29 LAB
CYSTIC FIBROSIS, EXTERNAL: NEGATIVE
HCT, EXTERNAL: 33.5
HGB, EXTERNAL: 10.8
PLATELET CNT,   EXTERNAL: 252

## 2018-10-29 RX ORDER — LANOLIN ALCOHOL/MO/W.PET/CERES
CREAM (GRAM) TOPICAL
COMMUNITY
End: 2018-12-19

## 2018-10-29 NOTE — PATIENT INSTRUCTIONS
Weeks 34 to 36 of Your Pregnancy: Care Instructions  Your Care Instructions    By now, your baby and your belly have grown quite large. It is almost time to give birth. A full-term pregnancy can deliver between 37 and 42 weeks. Your baby's lungs are almost ready to breathe air. The bones in your baby's head are now firm enough to protect it, but soft enough to move down through the birth canal.  You may feel excited, happy, anxious, or scared. You may wonder how you will know if you are in labor or what to expect during labor. Try to be flexible in your expectations of the birth. Because each birth is different, there is no way to know exactly what childbirth will be like for you. This care sheet will help you know what to expect and how to prepare. This may make your childbirth easier. If you haven't already had the Tdap shot during this pregnancy, talk to your doctor about getting it. It will help protect your  against pertussis infection. In the 36th week, most women have a test for group B streptococcus (GBS). GBS is a common bacteria that can live in the vagina and rectum. It can make your baby sick after birth. If you test positive, you will get antibiotics during labor. The medicine will keep your baby from getting the bacteria. Follow-up care is a key part of your treatment and safety. Be sure to make and go to all appointments, and call your doctor if you are having problems. It's also a good idea to know your test results and keep a list of the medicines you take. How can you care for yourself at home? Learn about pain relief choices  · Pain is different for every woman. Talk with your doctor about your feelings about pain. · You can choose from several types of pain relief. These include medicine or breathing techniques, as well as comfort measures. You can use more than one option. · If you choose to have pain medicine during labor, talk to your doctor about your options.  Some medicines lower anxiety and help with some of the pain. Others make your lower body numb so that you won't feel pain. · Be sure to tell your doctor about your pain medicine choice before you start labor or very early in your labor. You may be able to change your mind as labor progresses. · Rarely, a woman is put to sleep by medicine given through a mask or an IV. Labor and delivery  · The first stage of labor has three parts: early, active, and transition. ? Most women have early labor at home. You can stay busy or rest, eat light snacks, drink clear fluids, and start counting contractions. ? When talking during a contraction gets hard, you may be moving to active labor. During active labor, you should head for the hospital if you are not there already. ? You are in active labor when contractions come every 3 to 4 minutes and last about 60 seconds. Your cervix is opening more rapidly. ? If your water breaks, contractions will come faster and stronger. ? During transition, your cervix is stretching, and contractions are coming more rapidly. ? You may want to push, but your cervix might not be ready. Your doctor will tell you when to push. · The second stage starts when your cervix is completely opened and you are ready to push. ? Contractions are very strong to push the baby down the birth canal.  ? You will feel the urge to push. You may feel like you need to have a bowel movement. ? You may be coached to push with contractions. These contractions will be very strong, but you will not have them as often. You can get a little rest between contractions. ? You may be emotional and irritable. You may not be aware of what is going on around you.  ? One last push, and your baby is born. · The third stage is when a few more contractions push out the placenta. This may take 30 minutes or less. · The fourth stage is the welcome recovery. You may feel overwhelmed with emotions and exhausted but alert.  This is a good time to start breastfeeding. Where can you learn more? Go to http://reji-yuan.info/. Enter B389 in the search box to learn more about \"Weeks 34 to 36 of Your Pregnancy: Care Instructions. \"  Current as of: November 21, 2017  Content Version: 11.8  © 8039-5467 [employer]. Care instructions adapted under license by [employer] (which disclaims liability or warranty for this information). If you have questions about a medical condition or this instruction, always ask your healthcare professional. [address] any warranty or liability for your use of this information.

## 2018-10-29 NOTE — PROGRESS NOTES
+FM. Some LLQ discomfort; +diastasis recti. QXH=464 on dopper -> NST reactive, oqohjswc=359. CF not drawn prior to transfer (finally got clarification from prev off), would like drawn today, declines [id number]. Anemia, taking iron once daily => CBC today. RTO 2wks. NST procedure note    Jazmine Euceda is a ,  26 y.o. female [facility] whose LMP  was on  who presents for fetal non-stress test.  In office today for [facility] [address] 9038. GOX=[id number] on doppler    She is 34w1d and was monitored for 23 minutes and the FHR was reactive. NST Interpretation:    FHR baseline 155 bpm, variability moderate, accelerations present, decelerations Absent. Uterine contractions were absent. Susana Samuel was informed of the NST results and her questions were answered.     Disposition:    - return to clinic as scheduled

## 2018-11-06 LAB
CFTR MUT ANL BLD/T: NORMAL
ERYTHROCYTE [DISTWIDTH] IN BLOOD BY AUTOMATED COUNT: 14.9 % (ref 12.3–15.4)
GENE DIS ANL CARRIER INTERP-IMP: NORMAL
HCT VFR BLD AUTO: 33.5 % (ref 34–46.6)
HGB BLD-MCNC: 10.8 G/DL (ref 11.1–15.9)
MCH RBC QN AUTO: 28.3 PG (ref 26.6–33)
MCHC RBC AUTO-ENTMCNC: 32.2 G/DL (ref 31.5–35.7)
MCV RBC AUTO: 88 FL (ref 79–97)
PLATELET # BLD AUTO: 252 X10E3/UL (ref 150–379)
RBC # BLD AUTO: 3.82 X10E6/UL (ref 3.77–5.28)
WBC # BLD AUTO: 12.4 X10E3/UL (ref 3.4–10.8)

## 2018-11-12 ENCOUNTER — ROUTINE PRENATAL (OUTPATIENT)
Dept: OBGYN CLINIC | Age: 23
End: 2018-11-12

## 2018-11-12 VITALS — WEIGHT: 173 LBS | SYSTOLIC BLOOD PRESSURE: 114 MMHG | DIASTOLIC BLOOD PRESSURE: 80 MMHG | BODY MASS INDEX: 27.1 KG/M2

## 2018-11-12 DIAGNOSIS — Z34.03 ENCOUNTER FOR SUPERVISION OF NORMAL FIRST PREGNANCY IN THIRD TRIMESTER: Primary | ICD-10-CM

## 2018-11-12 LAB — GRBS, EXTERNAL: POSITIVE

## 2018-11-12 NOTE — PROGRESS NOTES
Complains of stiffness in hands in the morning and at night.  Hemorids continue after using Preparation H

## 2018-11-15 LAB — B-HEM STREP SPEC QL CULT: POSITIVE

## 2018-11-19 ENCOUNTER — ROUTINE PRENATAL (OUTPATIENT)
Dept: OBGYN CLINIC | Age: 23
End: 2018-11-19

## 2018-11-19 VITALS
SYSTOLIC BLOOD PRESSURE: 126 MMHG | HEIGHT: 67 IN | DIASTOLIC BLOOD PRESSURE: 86 MMHG | WEIGHT: 173 LBS | BODY MASS INDEX: 27.15 KG/M2

## 2018-11-19 DIAGNOSIS — Z34.03 ENCOUNTER FOR SUPERVISION OF NORMAL FIRST PREGNANCY IN THIRD TRIMESTER: Primary | ICD-10-CM

## 2018-11-19 NOTE — PROGRESS NOTES
+FM.  No HA, visual changes, RUQ/JULIANA pain. Some swelling of feet. PE: abd soft, NT; uts soft/NT; extr - no edema, DTRs3+, no clonus. GBS pos. RTO 1wk. - transfer OB @ 12wks  - U=D -> SHENA=12/9/18  - declines aneuploidy/carrier/AFP   - Rh NEGATIVE -> Rhogam @ 28wks (9/17)  - US (7/24/18) 19+4 @ 20+2. Nl morph. Low-lying post placenta (1.5-1.8cm from os)  - US (10/15/18) 32+5 @ 32+1. 1987gm (52%). GHADA=19. Post placenta, not low-lying.  Vtx.  - hgb (9/17)=10.4 -> OTC iron once daily -> (10/29 34wks)=10.8

## 2018-11-26 ENCOUNTER — ROUTINE PRENATAL (OUTPATIENT)
Dept: OBGYN CLINIC | Age: 23
End: 2018-11-26

## 2018-11-26 ENCOUNTER — HOSPITAL ENCOUNTER (EMERGENCY)
Age: 23
Discharge: HOME | End: 2018-11-26
Attending: OBSTETRICS & GYNECOLOGY | Admitting: OBSTETRICS & GYNECOLOGY
Payer: COMMERCIAL

## 2018-11-26 VITALS
SYSTOLIC BLOOD PRESSURE: 120 MMHG | OXYGEN SATURATION: 96 % | DIASTOLIC BLOOD PRESSURE: 67 MMHG | TEMPERATURE: 97.7 F | HEIGHT: 67 IN | WEIGHT: 177 LBS | BODY MASS INDEX: 27.78 KG/M2 | HEART RATE: 83 BPM

## 2018-11-26 VITALS — DIASTOLIC BLOOD PRESSURE: 82 MMHG | BODY MASS INDEX: 27.72 KG/M2 | WEIGHT: 177 LBS | SYSTOLIC BLOOD PRESSURE: 146 MMHG

## 2018-11-26 DIAGNOSIS — Z3A.38 38 WEEKS GESTATION OF PREGNANCY: ICD-10-CM

## 2018-11-26 DIAGNOSIS — O16.3 HYPERTENSION AFFECTING PREGNANCY IN THIRD TRIMESTER: Primary | ICD-10-CM

## 2018-11-26 LAB
ALBUMIN SERPL-MCNC: 2.8 G/DL (ref 3.5–5)
ALBUMIN/GLOB SERPL: 0.8 {RATIO} (ref 1.1–2.2)
ALP SERPL-CCNC: 143 U/L (ref 45–117)
ALT SERPL-CCNC: 15 U/L (ref 12–78)
ANION GAP SERPL CALC-SCNC: 11 MMOL/L (ref 5–15)
AST SERPL-CCNC: 15 U/L (ref 15–37)
BASOPHILS # BLD: 0 K/UL (ref 0–0.1)
BASOPHILS NFR BLD: 0 % (ref 0–1)
BILIRUB SERPL-MCNC: 0.2 MG/DL (ref 0.2–1)
BUN SERPL-MCNC: 8 MG/DL (ref 6–20)
BUN/CREAT SERPL: 14 (ref 12–20)
CALCIUM SERPL-MCNC: 9.1 MG/DL (ref 8.5–10.1)
CHLORIDE SERPL-SCNC: 106 MMOL/L (ref 97–108)
CO2 SERPL-SCNC: 22 MMOL/L (ref 21–32)
CREAT SERPL-MCNC: 0.59 MG/DL (ref 0.55–1.02)
CREAT UR-MCNC: 68.73 MG/DL
DIFFERENTIAL METHOD BLD: ABNORMAL
EOSINOPHIL # BLD: 0.1 K/UL (ref 0–0.4)
EOSINOPHIL NFR BLD: 1 % (ref 0–7)
ERYTHROCYTE [DISTWIDTH] IN BLOOD BY AUTOMATED COUNT: 14.5 % (ref 11.5–14.5)
GLOBULIN SER CALC-MCNC: 3.5 G/DL (ref 2–4)
GLUCOSE SERPL-MCNC: 67 MG/DL (ref 65–100)
HCT VFR BLD AUTO: 35.9 % (ref 35–47)
HGB BLD-MCNC: 11.4 G/DL (ref 11.5–16)
IMM GRANULOCYTES # BLD: 0.1 K/UL (ref 0–0.04)
IMM GRANULOCYTES NFR BLD AUTO: 1 % (ref 0–0.5)
LDH SERPL L TO P-CCNC: 149 U/L (ref 81–246)
LYMPHOCYTES # BLD: 2.1 K/UL (ref 0.8–3.5)
LYMPHOCYTES NFR BLD: 18 % (ref 12–49)
MCH RBC QN AUTO: 27.9 PG (ref 26–34)
MCHC RBC AUTO-ENTMCNC: 31.8 G/DL (ref 30–36.5)
MCV RBC AUTO: 87.8 FL (ref 80–99)
MONOCYTES # BLD: 0.8 K/UL (ref 0–1)
MONOCYTES NFR BLD: 7 % (ref 5–13)
NEUTS SEG # BLD: 8.5 K/UL (ref 1.8–8)
NEUTS SEG NFR BLD: 73 % (ref 32–75)
NRBC # BLD: 0 K/UL (ref 0–0.01)
NRBC BLD-RTO: 0 PER 100 WBC
PLATELET # BLD AUTO: 304 K/UL (ref 150–400)
PMV BLD AUTO: 10.5 FL (ref 8.9–12.9)
POTASSIUM SERPL-SCNC: 3.9 MMOL/L (ref 3.5–5.1)
PROT SERPL-MCNC: 6.3 G/DL (ref 6.4–8.2)
PROT UR-MCNC: 14 MG/DL (ref 0–11.9)
PROT/CREAT UR-RTO: 0.2
RBC # BLD AUTO: 4.09 M/UL (ref 3.8–5.2)
SODIUM SERPL-SCNC: 139 MMOL/L (ref 136–145)
URATE SERPL-MCNC: 5.5 MG/DL (ref 2.6–6)
WBC # BLD AUTO: 11.7 K/UL (ref 3.6–11)

## 2018-11-26 PROCEDURE — 84550 ASSAY OF BLOOD/URIC ACID: CPT

## 2018-11-26 PROCEDURE — 99285 EMERGENCY DEPT VISIT HI MDM: CPT

## 2018-11-26 PROCEDURE — 85025 COMPLETE CBC W/AUTO DIFF WBC: CPT

## 2018-11-26 PROCEDURE — 36415 COLL VENOUS BLD VENIPUNCTURE: CPT

## 2018-11-26 PROCEDURE — 80053 COMPREHEN METABOLIC PANEL: CPT

## 2018-11-26 PROCEDURE — 59025 FETAL NON-STRESS TEST: CPT

## 2018-11-26 PROCEDURE — 83615 LACTATE (LD) (LDH) ENZYME: CPT

## 2018-11-26 PROCEDURE — 84156 ASSAY OF PROTEIN URINE: CPT

## 2018-11-26 NOTE — DISCHARGE INSTRUCTIONS
Week 38 of Your Pregnancy: Care Instructions  Your Care Instructions    Believe it or not, your baby is almost here. You may have ideas about your baby's personality because of how much he or she moves. Or you may have noticed how he or she responds to sounds, warmth, cold, and light. You may even know what kind of music your baby likes. By now, you have a better idea of what to expect during delivery. You may have talked about your birth preferences with your doctor. But even if you want a vaginal birth, it is a good idea to learn about  births.  birth means that your baby is born through a cut (incision) in your lower belly. It is sometimes the best choice for the health of the baby and the mother. This care sheet can help you understand  births. It also gives you information about what to expect after your baby is born. And it helps you understand more about postpartum depression. Follow-up care is a key part of your treatment and safety. Be sure to make and go to all appointments, and call your doctor if you are having problems. It's also a good idea to know your test results and keep a list of the medicines you take. How can you care for yourself at home? Learn about  birth  · Most C-sections are unplanned. They are done because of problems that occur during labor. These problems might include:  ? Labor that slows or stops. ? High blood pressure or other problems for the mother. ? Signs of distress in the baby. These signs may include a very fast or slow heart rate. · Although most mothers and babies do well after , it is major surgery. It has more risks than a vaginal delivery. · In some cases, a planned  may be safer than a vaginal delivery. This may be the case if:  ? The mother has a health problem, such as a heart condition. ? The baby isn't in a head-down position for delivery. This is called a breech position. ?  The uterus has scars from past surgeries. This could increase the chance of a tear in the uterus. ? There is a problem with the placenta. ? The mother has an infection, such as genital herpes, that could be spread to the baby. ? The mother is having twins or more. ? The baby weighs 9 to 10 pounds or more. · Because of the risks of , planned C-sections generally should be done only for medical reasons. And a planned  should be done at 39 weeks or later unless there is a medical reason to do it sooner. Know what to expect after delivery, and plan for the first few weeks at home  · You, your baby, and your partner or  will get identification bands. Only people with matching bands can  the baby from the nursery. · You will learn how to feed, diaper, and bathe your baby. And you will learn how to care for the umbilical cord stump. If your baby will be circumcised, you will also learn how to care for that. · Ask people to wait to visit you until you are at home. And ask them to wash their hands before they touch your baby. · Make sure you have another adult in your home for at least 2 or 3 days after the birth. · During the first 2 weeks, limit when friends and family can visit. · Do not allow visitors who have colds or infections. Make sure all visitors are up to date with their vaccinations. Never let anyone smoke around your baby. · Try to nap when the baby naps. Be aware of postpartum depression  · \"Baby blues\" are common for the first 1 to 2 weeks after birth. You may cry or feel sad or irritable for no reason. · For some women, these feelings last longer and are more intense. This is called postpartum depression. · If your symptoms last for more than a few weeks or you feel very depressed, ask your doctor for help. · Postpartum depression can be treated. Support groups and counseling can help. Sometimes medicine can also help. Where can you learn more?   Go to http://reji-yuan.info/. Enter B044 in the search box to learn more about \"Week 38 of Your Pregnancy: Care Instructions. \"  Current as of: November 21, 2017  Content Version: 11.8  © 8700-0141 [employer]. Care instructions adapted under license by [employer] (which disclaims liability or warranty for this information). If you have questions about a medical condition or this instruction, always ask your healthcare professional. [address] any warranty or liability for your use of this information. Counting Your Baby's Kicks: Care Instructions  Your Care Instructions    Counting your baby's kicks is one way your doctor can tell that your baby is healthy. Most women--especially in a first pregnancy--feel their baby move for the first time between 16 and 22 weeks. The movement may feel like flutters rather than kicks. Your baby may move more at certain times of the day. When you are active, you may notice less kicking than when you are resting. At your prenatal visits, your doctor will ask whether the baby is active. In your last trimester, your doctor may ask you to count the number of times you feel your baby move. Follow-up care is a key part of your treatment and safety. Be sure to make and go to all appointments, and call your doctor if you are having problems. It's also a good idea to know your test results and keep a list of the medicines you take. How do you count fetal kicks? · A common method of checking your baby's movement is to count the number of kicks or moves you feel in 1 hour. Ten movements (such as kicks, flutters, or rolls) in 1 hour are normal. Some doctors suggest that you count in the morning until you get to 10 movements. Then you can quit for that day and start again the next day. · Pick your baby's most active time of day to count. This may be any time from morning to evening.   · If you do not feel 10 movements in an hour, your baby may be sleeping. Wait for the next hour and count again. When should you call for help? Call your doctor now or seek immediate medical care if:    · You noticed that your baby has stopped moving or is moving much less than normal.    Watch closely for changes in your health, and be sure to contact your doctor if you have any problems. Where can you learn more? Go to http://reji-yuan.info/. Enter Q908 in the search box to learn more about \"Counting Your Baby's Kicks: Care Instructions. \"  Current as of: November 21, 2017  Content Version: 11.8  © 1534-7857 [employer]. Care instructions adapted under license by [employer] (which disclaims liability or warranty for this information). If you have questions about a medical condition or this instruction, always ask your healthcare professional. [address] any warranty or liability for your use of this information.

## 2018-11-26 NOTE — PROGRESS NOTES
No c/o.  +FM. Some [address] last wk. Cvx=loose 1cm/50/-2-3/post/med/ceph -> to L&D r/o PIH      - transfer OB @ 12wks  - U=D -> SHENA=12/9/18  - declines aneuploidy/carrier/AFP   - Rh NEGATIVE -> Rhogam @ 28wks (9/17)  - US (7/24/18) 19+4 @ 20+2. Nl morph. Low-lying post placenta (1.5-1.8cm from os)  - US (10/15/18) 32+5 @ 32+1. 1987gm (52%). GHADA=19. Post placenta, not low-lying.  Vtx.  - hgb (9/17)=10.4 -> OTC iron once daily -> (10/29 34wks)=10.8

## 2018-11-26 NOTE — PROGRESS NOTES
[facility] 19 admitted under triage from MD office for elevated systolic BP. NKDA. Pt denies any health problems outside of the pregnancy or with the pregnancy thus far. 24 hour urine started. [address] labs drawn and sent [address] EFM removed FHR reactive [facility] BP's WNL. Labs all good. Will call Dr Moseley Forward and inform her 
2:05 PM Spoke with Dr Moseley Forward. She reviewed labs and BP's in the office. .  Let her know FM tracing was reactive. TORB to discharge pt home. Also that pt does not need to finish her 24 hour urine. 2:18 PM Went over discharge teaching with the pt 
2:21 PM Went over discharge teaching with the pt. Pt aware she is to f/u next week Monday in her office.

## 2018-12-03 ENCOUNTER — ROUTINE PRENATAL (OUTPATIENT)
Dept: OBGYN CLINIC | Age: 23
End: 2018-12-03

## 2018-12-03 VITALS — BODY MASS INDEX: 28.13 KG/M2 | DIASTOLIC BLOOD PRESSURE: 78 MMHG | SYSTOLIC BLOOD PRESSURE: 124 MMHG | WEIGHT: 179.6 LBS

## 2018-12-03 DIAGNOSIS — Z34.03 ENCOUNTER FOR SUPERVISION OF NORMAL FIRST PREGNANCY IN THIRD TRIMESTER: Primary | ICD-10-CM

## 2018-12-17 ENCOUNTER — HOSPITAL ENCOUNTER (INPATIENT)
Age: 23
LOS: 2 days | Discharge: HOME | End: 2018-12-19
Attending: OBSTETRICS & GYNECOLOGY | Admitting: OBSTETRICS & GYNECOLOGY
Payer: COMMERCIAL

## 2018-12-17 DIAGNOSIS — R52 POSTPARTUM PAIN: Primary | ICD-10-CM

## 2018-12-17 LAB
ALBUMIN SERPL-MCNC: 2.7 G/DL (ref 3.5–5)
ALBUMIN/GLOB SERPL: 0.8 {RATIO} (ref 1.1–2.2)
ALP SERPL-CCNC: 159 U/L (ref 45–117)
ALT SERPL-CCNC: 19 U/L (ref 12–78)
ANION GAP SERPL CALC-SCNC: 13 MMOL/L (ref 5–15)
AST SERPL-CCNC: 18 U/L (ref 15–37)
BASOPHILS # BLD: 0 K/UL (ref 0–0.1)
BASOPHILS NFR BLD: 0 % (ref 0–1)
BILIRUB SERPL-MCNC: 0.2 MG/DL (ref 0.2–1)
BUN SERPL-MCNC: 8 MG/DL (ref 6–20)
BUN/CREAT SERPL: 11 (ref 12–20)
CALCIUM SERPL-MCNC: 9.3 MG/DL (ref 8.5–10.1)
CHLORIDE SERPL-SCNC: 105 MMOL/L (ref 97–108)
CO2 SERPL-SCNC: 22 MMOL/L (ref 21–32)
CREAT SERPL-MCNC: 0.74 MG/DL (ref 0.55–1.02)
DIFFERENTIAL METHOD BLD: ABNORMAL
EOSINOPHIL # BLD: 0.1 K/UL (ref 0–0.4)
EOSINOPHIL NFR BLD: 1 % (ref 0–7)
ERYTHROCYTE [DISTWIDTH] IN BLOOD BY AUTOMATED COUNT: 14.6 % (ref 11.5–14.5)
GLOBULIN SER CALC-MCNC: 3.3 G/DL (ref 2–4)
GLUCOSE SERPL-MCNC: 94 MG/DL (ref 65–100)
HCT VFR BLD AUTO: 36.2 % (ref 35–47)
HGB BLD-MCNC: 11.6 G/DL (ref 11.5–16)
IMM GRANULOCYTES # BLD: 0.1 K/UL (ref 0–0.04)
IMM GRANULOCYTES NFR BLD AUTO: 1 % (ref 0–0.5)
LYMPHOCYTES # BLD: 2.7 K/UL (ref 0.8–3.5)
LYMPHOCYTES NFR BLD: 22 % (ref 12–49)
MCH RBC QN AUTO: 28.3 PG (ref 26–34)
MCHC RBC AUTO-ENTMCNC: 32 G/DL (ref 30–36.5)
MCV RBC AUTO: 88.3 FL (ref 80–99)
MONOCYTES # BLD: 0.7 K/UL (ref 0–1)
MONOCYTES NFR BLD: 6 % (ref 5–13)
NEUTS SEG # BLD: 8.7 K/UL (ref 1.8–8)
NEUTS SEG NFR BLD: 71 % (ref 32–75)
NRBC # BLD: 0 K/UL (ref 0–0.01)
NRBC BLD-RTO: 0 PER 100 WBC
PLATELET # BLD AUTO: 288 K/UL (ref 150–400)
PMV BLD AUTO: 11.6 FL (ref 8.9–12.9)
POTASSIUM SERPL-SCNC: 3.5 MMOL/L (ref 3.5–5.1)
PROT SERPL-MCNC: 6 G/DL (ref 6.4–8.2)
RBC # BLD AUTO: 4.1 M/UL (ref 3.8–5.2)
SODIUM SERPL-SCNC: 140 MMOL/L (ref 136–145)
WBC # BLD AUTO: 12.3 K/UL (ref 3.6–11)

## 2018-12-17 PROCEDURE — 75410000002 HC LABOR FEE PER 1 HR: Performed by: OBSTETRICS & GYNECOLOGY

## 2018-12-17 PROCEDURE — 74011250636 HC RX REV CODE- 250/636: Performed by: OBSTETRICS & GYNECOLOGY

## 2018-12-17 PROCEDURE — 0KQM0ZZ REPAIR PERINEUM MUSCLE, OPEN APPROACH: ICD-10-PCS | Performed by: OBSTETRICS & GYNECOLOGY

## 2018-12-17 PROCEDURE — 77030018749 HC HK AMNIO DISP DERY -A

## 2018-12-17 PROCEDURE — 80053 COMPREHEN METABOLIC PANEL: CPT

## 2018-12-17 PROCEDURE — 75410000000 HC DELIVERY VAGINAL/SINGLE: Performed by: OBSTETRICS & GYNECOLOGY

## 2018-12-17 PROCEDURE — 3E033VJ INTRODUCTION OF OTHER HORMONE INTO PERIPHERAL VEIN, PERCUTANEOUS APPROACH: ICD-10-PCS | Performed by: OBSTETRICS & GYNECOLOGY

## 2018-12-17 PROCEDURE — 77010026065 HC OXYGEN MINIMUM MEDICAL AIR: Performed by: OBSTETRICS & GYNECOLOGY

## 2018-12-17 PROCEDURE — 85025 COMPLETE CBC W/AUTO DIFF WBC: CPT

## 2018-12-17 PROCEDURE — 75410000003 HC RECOV DEL/VAG/CSECN EA 0.5 HR: Performed by: OBSTETRICS & GYNECOLOGY

## 2018-12-17 PROCEDURE — 74011250637 HC RX REV CODE- 250/637: Performed by: OBSTETRICS & GYNECOLOGY

## 2018-12-17 PROCEDURE — 65270000029 HC RM PRIVATE

## 2018-12-17 PROCEDURE — 10907ZC DRAINAGE OF AMNIOTIC FLUID, THERAPEUTIC FROM PRODUCTS OF CONCEPTION, VIA NATURAL OR ARTIFICIAL OPENING: ICD-10-PCS | Performed by: OBSTETRICS & GYNECOLOGY

## 2018-12-17 PROCEDURE — 36415 COLL VENOUS BLD VENIPUNCTURE: CPT

## 2018-12-17 PROCEDURE — 74011000258 HC RX REV CODE- 258: Performed by: OBSTETRICS & GYNECOLOGY

## 2018-12-17 RX ORDER — DOCUSATE SODIUM 100 MG/1
100 CAPSULE, LIQUID FILLED ORAL
Status: DISCONTINUED | OUTPATIENT
Start: 2018-12-17 | End: 2018-12-19 | Stop reason: HOSPADM

## 2018-12-17 RX ORDER — IBUPROFEN 800 MG/1
800 TABLET ORAL
Status: CANCELLED | OUTPATIENT
Start: 2018-12-17

## 2018-12-17 RX ORDER — IBUPROFEN 800 MG/1
800 TABLET ORAL
Status: DISCONTINUED | OUTPATIENT
Start: 2018-12-17 | End: 2018-12-19 | Stop reason: HOSPADM

## 2018-12-17 RX ORDER — OXYTOCIN/RINGER'S LACTATE 20/1000 ML
125 PLASTIC BAG, INJECTION (ML) INTRAVENOUS ONCE
Status: ACTIVE | OUTPATIENT
Start: 2018-12-17 | End: 2018-12-18

## 2018-12-17 RX ORDER — OXYTOCIN/RINGER'S LACTATE 20/1000 ML
125-500 PLASTIC BAG, INJECTION (ML) INTRAVENOUS ONCE
Status: CANCELLED | OUTPATIENT
Start: 2018-12-17 | End: 2018-12-17

## 2018-12-17 RX ORDER — HYDROCODONE BITARTRATE AND ACETAMINOPHEN 5; 325 MG/1; MG/1
1 TABLET ORAL
Status: DISCONTINUED | OUTPATIENT
Start: 2018-12-17 | End: 2018-12-19 | Stop reason: HOSPADM

## 2018-12-17 RX ORDER — NALOXONE HYDROCHLORIDE 0.4 MG/ML
0.4 INJECTION, SOLUTION INTRAMUSCULAR; INTRAVENOUS; SUBCUTANEOUS AS NEEDED
Status: DISCONTINUED | OUTPATIENT
Start: 2018-12-17 | End: 2018-12-18 | Stop reason: HOSPADM

## 2018-12-17 RX ORDER — MINERAL OIL
120 OIL (ML) ORAL ONCE
Status: ACTIVE | OUTPATIENT
Start: 2018-12-17 | End: 2018-12-17

## 2018-12-17 RX ORDER — HYDROCODONE BITARTRATE AND ACETAMINOPHEN 5; 325 MG/1; MG/1
1 TABLET ORAL
Status: CANCELLED | OUTPATIENT
Start: 2018-12-17

## 2018-12-17 RX ORDER — HYDROCORTISONE ACETATE PRAMOXINE HCL 2.5; 1 G/100G; G/100G
CREAM TOPICAL AS NEEDED
Status: DISCONTINUED | OUTPATIENT
Start: 2018-12-17 | End: 2018-12-19 | Stop reason: HOSPADM

## 2018-12-17 RX ORDER — LIDOCAINE HYDROCHLORIDE 10 MG/ML
20 INJECTION INFILTRATION; PERINEURAL ONCE
Status: DISPENSED | OUTPATIENT
Start: 2018-12-17 | End: 2018-12-17

## 2018-12-17 RX ORDER — SODIUM CHLORIDE 0.9 % (FLUSH) 0.9 %
5-10 SYRINGE (ML) INJECTION EVERY 8 HOURS
Status: DISCONTINUED | OUTPATIENT
Start: 2018-12-17 | End: 2018-12-19

## 2018-12-17 RX ORDER — SIMETHICONE 80 MG
80 TABLET,CHEWABLE ORAL
Status: CANCELLED | OUTPATIENT
Start: 2018-12-17

## 2018-12-17 RX ORDER — SODIUM CHLORIDE 0.9 % (FLUSH) 0.9 %
5-10 SYRINGE (ML) INJECTION AS NEEDED
Status: DISCONTINUED | OUTPATIENT
Start: 2018-12-17 | End: 2018-12-19

## 2018-12-17 RX ORDER — SIMETHICONE 80 MG
80 TABLET,CHEWABLE ORAL
Status: DISCONTINUED | OUTPATIENT
Start: 2018-12-17 | End: 2018-12-19 | Stop reason: HOSPADM

## 2018-12-17 RX ORDER — HYDROCORTISONE ACETATE PRAMOXINE HCL 2.5; 1 G/100G; G/100G
CREAM TOPICAL AS NEEDED
Status: CANCELLED | OUTPATIENT
Start: 2018-12-17

## 2018-12-17 RX ORDER — OXYTOCIN/0.9 % SODIUM CHLORIDE 30/500 ML
0-20 PLASTIC BAG, INJECTION (ML) INTRAVENOUS
Status: DISCONTINUED | OUTPATIENT
Start: 2018-12-17 | End: 2018-12-18 | Stop reason: HOSPADM

## 2018-12-17 RX ORDER — MAG HYDROX/ALUMINUM HYD/SIMETH 200-200-20
30 SUSPENSION, ORAL (FINAL DOSE FORM) ORAL
Status: DISCONTINUED | OUTPATIENT
Start: 2018-12-17 | End: 2018-12-18 | Stop reason: HOSPADM

## 2018-12-17 RX ORDER — DOCUSATE SODIUM 100 MG/1
100 CAPSULE, LIQUID FILLED ORAL
Status: CANCELLED | OUTPATIENT
Start: 2018-12-17

## 2018-12-17 RX ORDER — NALOXONE HYDROCHLORIDE 0.4 MG/ML
0.4 INJECTION, SOLUTION INTRAMUSCULAR; INTRAVENOUS; SUBCUTANEOUS AS NEEDED
Status: CANCELLED | OUTPATIENT
Start: 2018-12-17

## 2018-12-17 RX ORDER — KETOROLAC TROMETHAMINE 30 MG/ML
30 INJECTION, SOLUTION INTRAMUSCULAR; INTRAVENOUS
Status: COMPLETED | OUTPATIENT
Start: 2018-12-17 | End: 2018-12-17

## 2018-12-17 RX ORDER — NALOXONE HYDROCHLORIDE 0.4 MG/ML
0.4 INJECTION, SOLUTION INTRAMUSCULAR; INTRAVENOUS; SUBCUTANEOUS AS NEEDED
Status: DISCONTINUED | OUTPATIENT
Start: 2018-12-17 | End: 2018-12-19 | Stop reason: HOSPADM

## 2018-12-17 RX ORDER — SODIUM CHLORIDE, SODIUM LACTATE, POTASSIUM CHLORIDE, CALCIUM CHLORIDE 600; 310; 30; 20 MG/100ML; MG/100ML; MG/100ML; MG/100ML
125 INJECTION, SOLUTION INTRAVENOUS CONTINUOUS
Status: DISCONTINUED | OUTPATIENT
Start: 2018-12-17 | End: 2018-12-18 | Stop reason: HOSPADM

## 2018-12-17 RX ORDER — PROMETHAZINE HYDROCHLORIDE 25 MG/1
25 TABLET ORAL
Status: CANCELLED | OUTPATIENT
Start: 2018-12-17

## 2018-12-17 RX ORDER — PROMETHAZINE HYDROCHLORIDE 25 MG/1
25 TABLET ORAL
Status: DISCONTINUED | OUTPATIENT
Start: 2018-12-17 | End: 2018-12-19 | Stop reason: HOSPADM

## 2018-12-17 RX ADMIN — SODIUM CHLORIDE, SODIUM LACTATE, POTASSIUM CHLORIDE, AND CALCIUM CHLORIDE 125 ML/HR: 600; 310; 30; 20 INJECTION, SOLUTION INTRAVENOUS at 12:18

## 2018-12-17 RX ADMIN — SODIUM CHLORIDE, SODIUM LACTATE, POTASSIUM CHLORIDE, AND CALCIUM CHLORIDE 125 ML/HR: 600; 310; 30; 20 INJECTION, SOLUTION INTRAVENOUS at 06:04

## 2018-12-17 RX ADMIN — SODIUM CHLORIDE, SODIUM LACTATE, POTASSIUM CHLORIDE, AND CALCIUM CHLORIDE 999 ML/HR: 600; 310; 30; 20 INJECTION, SOLUTION INTRAVENOUS at 20:54

## 2018-12-17 RX ADMIN — KETOROLAC TROMETHAMINE 30 MG: 30 INJECTION, SOLUTION INTRAMUSCULAR at 18:21

## 2018-12-17 RX ADMIN — PENICILLIN G POTASSIUM 2.5 MILLION UNITS: 20000000 POWDER, FOR SOLUTION INTRAVENOUS at 14:48

## 2018-12-17 RX ADMIN — HYDROCODONE BITARTRATE AND ACETAMINOPHEN 1 TABLET: 5; 325 TABLET ORAL at 23:42

## 2018-12-17 RX ADMIN — DOCUSATE SODIUM 100 MG: 100 CAPSULE, LIQUID FILLED ORAL at 23:42

## 2018-12-17 RX ADMIN — SODIUM CHLORIDE 5 MILLION UNITS: 900 INJECTION, SOLUTION INTRAVENOUS at 06:05

## 2018-12-17 RX ADMIN — HYDROCODONE BITARTRATE AND ACETAMINOPHEN 1 TABLET: 5; 325 TABLET ORAL at 20:14

## 2018-12-17 RX ADMIN — IBUPROFEN 800 MG: 800 TABLET ORAL at 23:42

## 2018-12-17 RX ADMIN — PENICILLIN G POTASSIUM 2.5 MILLION UNITS: 20000000 POWDER, FOR SOLUTION INTRAVENOUS at 10:00

## 2018-12-17 RX ADMIN — Medication 10 ML: at 05:58

## 2018-12-17 RX ADMIN — OXYTOCIN-SODIUM CHLORIDE 0.9% IV SOLN 30 UNIT/500ML 2 MILLI-UNITS/MIN: 30-0.9/5 SOLUTION at 06:45

## 2018-12-17 NOTE — PROGRESS NOTES
S- breathing through ctx. On birthing ball. O - YCN=954, reactive, mod variability, +accels, no decels       Ctx q 2 minutes, pit @ 8       Cvx=5/100%/-1-0/ceph    A&P - 24yo  @ 41+1wks. Pitocin IOL for postdates.   Progressing well  - will decrease pitocin from 8 -> 6  - recheck in a couple of hours

## 2018-12-17 NOTE — H&P
History & Physical    Name: Ceci Lucas MRN: [id number]  SSN: [id number]    YOB: 1995  Age: 26 y.o. Sex: female        Subjective:     Estimated Date of Delivery: 18  OB History      Para Term  AB Living    1 0            SAB TAB Ectopic Molar Multiple Live Births                         Ms. Axel Rangel who presents with pregnancy at 41w1d for induction of labor. Prenatal course was normal other than mild anemia. Rh negative, rec'd Rhogam @ 28wks. Please see prenatal records for details. Mild HA this AM.  No visual changes, RUQ/JULIANA pain. +FM. No VB/LOF. Past Medical History:   Diagnosis Date    HPV vaccine counseling     Never screened     Routine Papanicolaou smear 2018    Negative (no hpv)     Vitamin D deficiency 2018     Past Surgical History:   Procedure Laterality Date    HX OTHER SURGICAL      wisdom teeth at 25yo     Social History     Occupational History    Not on file   Tobacco Use    Smoking status: Former Smoker     Packs/day: 0.50     Years: 4.00     Pack years: 2.00     Last attempt to quit:      Years since quitting: 3.9    Smokeless tobacco: Never Used    Tobacco comment: Never used vapor or e-cigs    Substance and Sexual Activity    Alcohol use: No    Drug use: No    Sexual activity: Yes     Partners: Male     Birth control/protection: None     Family History   Problem Relation Age of Onset    No Known Problems Mother     No Known Problems Father     Breast Cancer Maternal Grandmother        No Known Allergies  Prior to Admission medications    Medication Sig Start Date End Date Taking? Authorizing Provider   ferrous sulfate (IRON) 325 mg (65 mg iron) tablet Take  by mouth Daily (before breakfast). Yes Provider, Historical   PNV CHRISTINA.63/BCMTHKA fum/folic ac (PRENATAL PO) Take  by mouth.    Yes Provider, Historical   pramoxine-hydrocortisone (ANALPRAM HC) 1-1 % topical cream Apply  to affected area three (3) times daily as needed for Other. 30g 11/12/18   Wilberto Gao MD        Review of Systems: A comprehensive review of systems was negative except for that written in the HPI. Objective:     Vitals:  Vitals:    12/17/18 0544 12/17/18 0549 12/17/18 0558 12/17/18 0715   BP: (!) 158/94  141/83 130/70   Pulse: 92  88 87   Resp:   15    Temp:   98.4 °F (36.9 °C)    Weight:  178 lb (80.7 kg)     Height:  5' 7\" (1.702 m)          Physical Exam:  Patient without distress. Heart: Regular rate and rhythm or S1S2 present  Lung: clear to auscultation throughout lung fields, no wheezes, no rales, no rhonchi and normal respiratory effort  Abdomen: soft, nontender  Fundus: soft and non tender  Cervical Exam: 3 cm dilated    80% effaced    -2 station    Presenting Part: cephalic  Cervical Position: mid position  Consistency: Soft  Lower Extremities:  - Edema trace   - No cords or calf tenderness.   - Patellar Reflexes: 2-3+   - Clonus: absent  Membranes:  Artificial Rupture of Membranes; Amniotic Fluid: medium amount of clear fluid  Fetal Heart Rate: Reactive  Baseline: 145 per minute  Variability: moderate  Accelerations: yes  Decelerations: none  Uterine contractions: 2-5 min with some irritability  EFW: 3500gm    Prenatal Labs:   Lab Results   Component Value Date/Time    Rubella, External Immune 05/01/2018    GrBStrep, External Positive  11/12/2018    HBsAg, External Negative  05/01/2018    HIV, External Negative  05/01/2018    Gonorrhea, External Negative  05/01/2018    Chlamydia, External Negative  05/01/2018        Assessment/Plan:     Plan: Admit for IOL. Mildly elevated BPx2 on admission.   Rpt nl.    - Group B Strep was positive, will treat prophylactically with penicillin.  - AROM  - continue pitocin  - will [address]  - planning unmedicated labor, may have epidural if she changes her mind    Signed By:  [address], MD     December 17, 2018

## 2018-12-17 NOTE — L&D DELIVERY NOTE
Delivery Summary    Patient: Candance Loh MRN: [id number]  SSN: [id number]    YOB: 1995  Age: 26 y.o. Sex: female       Information for the patient's :  Josefina Yan [[id number]]       Labor Events:    Labor: No   Rupture Date: 2018   Rupture Time: 2:14 PM   Rupture Type AROM   Amniotic Fluid Volume:      Amniotic Fluid Description: Clear     Induction: Oxytocin       Augmentation: AROM   Labor Events: None     Cervical Ripening:     None     Delivery Events:  Episiotomy: None   Laceration(s): Second degree perineal     Repaired: Yes    Number of Repair Packets: 2   Suture Type and Size: Vicryl 2-0     Estimated Blood Loss (ml):  ml       Delivery Date: 2018    Delivery Time: 5:10 PM  Delivery Type: Vaginal, Spontaneous  Sex:  Male     Gestational Age: 40w1d   Delivery Clinician:  Val Caldera  Living Status: Living   Delivery Location: L&D            APGARS  One minute Five minutes Ten minutes   Skin color: 1   1        Heart rate: 2   2        Grimace: 2   2        Muscle tone: 2   2        Breathin   2        Totals: 9   9            Presentation: Vertex    Position: Right Occiput Anterior  Resuscitation Method:  Suctioning-bulb; Tactile Stimulation     Meconium Stained: None      Cord Information: 3 Vessels  Complications: None  Cord around: head  Delayed cord clamping? Yes  Cord clamped date/time:2018  5:11 PM  Disposition of Cord Blood: Discard    Blood Gases Sent?: No    Placenta:  Date/Time: 2018  5:26 PM  Removal: Spontaneous      Appearance: Normal     [place] Measurements:  Birth Weight:        Birth Length:        Head Circumference:        Chest Circumference:       Abdominal Girth: Other Providers:   Roberto GUZMAN;ROSENDO SMITH;Romaine MAGDALENO, Obstetrician;Primary Nurse;Primary [place] Nurse           , living male infant. MARCELO. Left shoulder anterior. Placenta spont, intact.  2nd degree lac repaired in usual fashion with 2.0-vicryl under local with 10cc 1% lidocaine. Group B Strep:   Lab Results   Component Value Date/Time    GrBStrep, External Positive  2018     Information for the patient's :  Heladio Pappas, Male [[id number]]   No results found for: ABORH, PCTABR, PCTDIG, BILI, ABORHEXT, ABORH    No results for input(s): PCO2CB, PO2CB, HCO3I, SO2I, IBD, PTEMPI, SPECTI, PHICB, ISITE, IDEV, IALLEN in the last 72 hours.

## 2018-12-17 NOTE — PROGRESS NOTES
3:52 PM resumed care of the pt from MEGAN garcia RN  4:13 PM Dr Chemo Finch at the bedside to check the pt and her progress with pushing  4:19 PM oxygen 8L NRBM applied T 98.7 axillary  4:42 PM Dr Chemo Finch at the bedside for delivery. 1710  Delivery of [facility] strong cry baby skin to skin with the mother  1750  Pt cleaned up ice pack applied pt out of stirrups.   Baby remains skin to skin VSS

## 2018-12-17 NOTE — PROGRESS NOTES
0021 - Patient arrived on unit, plan of care reviewed, oriented to unit. BP elevated at 158/94, no symptoms per patient. Patient here for post dates induction  0477 11 28 98 - Repeat /83, assessment completed. 9650 - 1st Dose of Antibiotics for positive GBS started  0645 - Pitocin started at 2 millunits/min per written order. 0716 - BP improved 130/70    0756 - Dr. Dora Manuel at bedside, cervical check 3cm, 80%, -2. AROM at this time, clear fluid. Mindi zurita for patient to ambulate in labor provided able to maintain continuous monitor. Fetal head well applied. 2943 - Patient on birthing ball  0930 - Patient santino every 2-3min, will continue to monitor. 1004 - Patient continues to labor on birthing ball, coping well. 2nd dose of antibiotics hung. 1055 - Patient up to restroom  1110 - Patient back in bed from birthing ball  1200- Patient back on birthing ball    1213 - Dr. Dora Manuel at bedside, cerivical check 5cm/100%/0. Discussed patient's contraction patter with Dr. Dora Manuel and will titrate pitocin down to 6. Will continue to monitor. 1215 - Decreasing oxytocin to 6 milliunits/min. Patient not tachysystole, but continues to contract every 2min. 1246 - Patient starting using nitrous oxide. Explained that as long as patient was out of bed with nitrous oxide she needed her spouse or a nurse at bedside at all times, and to stop use if she feels too dizzy. Patient demonstrates understanding, consents signed. 1414 - Patient complete, calling Dr. Dora Manuel, ok to push and call for delivery. 1421 - Starting pushing    1448 - 3rd dose of antibiotics given  1457 - Hands and knees pushing    1530 - Patient requests to go back to pushing on back. [place] back to back. 36 - SBAR Report given to ANJALI Coleman RN. Patient has been pushing since 31.10., has tried hands and knees for a while, back to using stirups. [place] has come back a little bit, but needs more maternal effort.

## 2018-12-17 NOTE — PROGRESS NOTES
Dr Taylor Espinoza notified pt c/c beginning to push with primary RN. Dr Taylor Espinoza requests to be called \"when you see head\"    Charge and primary RNs notified.

## 2018-12-18 ENCOUNTER — APPOINTMENT (OUTPATIENT)
Dept: GENERAL RADIOLOGY | Age: 23
End: 2018-12-18
Attending: PHYSICIAN ASSISTANT
Payer: COMMERCIAL

## 2018-12-18 VITALS
WEIGHT: 178 LBS | SYSTOLIC BLOOD PRESSURE: 128 MMHG | HEIGHT: 67 IN | OXYGEN SATURATION: 98 % | BODY MASS INDEX: 27.94 KG/M2 | DIASTOLIC BLOOD PRESSURE: 68 MMHG | RESPIRATION RATE: 16 BRPM | HEART RATE: 99 BPM | TEMPERATURE: 98.4 F

## 2018-12-18 PROCEDURE — 36415 COLL VENOUS BLD VENIPUNCTURE: CPT

## 2018-12-18 PROCEDURE — 86901 BLOOD TYPING SEROLOGIC RH(D): CPT

## 2018-12-18 PROCEDURE — 74011250636 HC RX REV CODE- 250/636: Performed by: OBSTETRICS & GYNECOLOGY

## 2018-12-18 PROCEDURE — 97161 PT EVAL LOW COMPLEX 20 MIN: CPT

## 2018-12-18 PROCEDURE — 74011250637 HC RX REV CODE- 250/637: Performed by: OBSTETRICS & GYNECOLOGY

## 2018-12-18 PROCEDURE — 73502 X-RAY EXAM HIP UNI 2-3 VIEWS: CPT

## 2018-12-18 PROCEDURE — 97116 GAIT TRAINING THERAPY: CPT

## 2018-12-18 PROCEDURE — 85461 HEMOGLOBIN FETAL: CPT

## 2018-12-18 PROCEDURE — 65270000029 HC RM PRIVATE

## 2018-12-18 RX ADMIN — HYDROCODONE BITARTRATE AND ACETAMINOPHEN 1 TABLET: 5; 325 TABLET ORAL at 19:35

## 2018-12-18 RX ADMIN — HUMAN RHO(D) IMMUNE GLOBULIN 0.3 MG: 300 INJECTION, SOLUTION INTRAMUSCULAR at 17:01

## 2018-12-18 RX ADMIN — HYDROCODONE BITARTRATE AND ACETAMINOPHEN 1 TABLET: 5; 325 TABLET ORAL at 23:51

## 2018-12-18 RX ADMIN — HYDROCODONE BITARTRATE AND ACETAMINOPHEN 1 TABLET: 5; 325 TABLET ORAL at 11:55

## 2018-12-18 RX ADMIN — IBUPROFEN 800 MG: 800 TABLET ORAL at 15:28

## 2018-12-18 RX ADMIN — DOCUSATE SODIUM 100 MG: 100 CAPSULE, LIQUID FILLED ORAL at 07:21

## 2018-12-18 RX ADMIN — IBUPROFEN 800 MG: 800 TABLET ORAL at 07:21

## 2018-12-18 RX ADMIN — HYDROCODONE BITARTRATE AND ACETAMINOPHEN 1 TABLET: 5; 325 TABLET ORAL at 15:28

## 2018-12-18 RX ADMIN — HYDROCODONE BITARTRATE AND ACETAMINOPHEN 1 TABLET: 5; 325 TABLET ORAL at 07:21

## 2018-12-18 RX ADMIN — Medication 10 ML: at 07:23

## 2018-12-18 RX ADMIN — IBUPROFEN 800 MG: 800 TABLET ORAL at 23:51

## 2018-12-18 NOTE — CONSULTS
ORTHO CONSULT    Subjective:     Date of Consultation:  December 18, 2018    Referring Physician:  Dr. Catherine Martinez is a 26 y.o. female we are consulted to see for L hip/groin pain s/p delivering her first child yesterday. Delivery notes do not reveal traumatic delivery. Pt. C/o L groin pain that is improved at rest. Worse when ambulating. Denies other pain or numbness in LLE. Patient Active Problem List    Diagnosis Date Noted    Vitamin D deficiency 06/26/2018    Pregnancy 06/25/2018     Family History   Problem Relation Age of Onset    No Known Problems Mother     No Known Problems Father     Breast Cancer Maternal Grandmother       Social History     Tobacco Use    Smoking status: Former Smoker     Packs/day: 0.50     Years: 4.00     Pack years: 2.00     Last attempt to quit: 2015     Years since quitting: 3.9    Smokeless tobacco: Never Used    Tobacco comment: Never used vapor or e-cigs    Substance Use Topics    Alcohol use: No     Past Medical History:   Diagnosis Date    HPV vaccine counseling     Never screened     Routine Papanicolaou smear 05/29/2018    Negative (no hpv)     Vitamin D deficiency 05/2018      Past Surgical History:   Procedure Laterality Date    HX OTHER SURGICAL      wisdom teeth at 27yo      Prior to Admission medications    Medication Sig Start Date End Date Taking? Authorizing Provider   ferrous sulfate (IRON) 325 mg (65 mg iron) tablet Take  by mouth Daily (before breakfast). Yes Provider, Historical   PNV CD.46/USZZZBJ fum/folic ac (PRENATAL PO) Take  by mouth. Yes Provider, Historical   pramoxine-hydrocortisone (ANALPRAM HC) 1-1 % topical cream Apply  to affected area three (3) times daily as needed for Other.  30g 11/12/18   Javan Emery MD     Current Facility-Administered Medications   Medication Dose Route Frequency    sodium chloride (NS) flush 5-10 mL  5-10 mL IntraVENous Q8H    sodium chloride (NS) flush 5-10 mL  5-10 mL IntraVENous PRN  ibuprofen (MOTRIN) tablet 800 mg  800 mg Oral Q8H PRN    HYDROcodone-acetaminophen (NORCO) 5-325 mg per tablet 1 Tab  1 Tab Oral Q4H PRN    naloxone (NARCAN) injection 0.4 mg  0.4 mg IntraVENous PRN    promethazine (PHENERGAN) tablet 25 mg  25 mg Oral Q6H PRN    simethicone (MYLICON) tablet 80 mg  80 mg Oral QID PRN    docusate sodium (COLACE) capsule 100 mg  100 mg Oral DAILY PRN    rho D immune globulin (RHOGAM) 1,500 unit (300 mcg) injection 0.3 mg  300 mcg IntraMUSCular ONCE PRN    witch hazel-glycerin (TUCKS) 12.5-50 % pads 1 Pad  1 Each PeriANAL PRN    hydrocortisone-pramoxine (ANALPRAM-HC) 2.5-1 % (4g) cream   Topical PRN     No Known Allergies     Review of Systems:  A comprehensive review of systems was negative except for that written in the HPI. Objective:     Visit Vitals  /59   Pulse (!) 107   Temp 98.2 °F (36.8 °C)   Resp 14   Ht 5' 7\" (1.702 m)   Wt 80.7 kg (178 lb)   LMP 03/04/2018   SpO2 98%   Breastfeeding? Unknown   BMI 27.88 kg/m²       EXAM: I examined Pt's L hip. No pain to palpation of the L trochanteric bursa on exam. No groin pain with passive rotation of the hip. NVI distally BLE's. No groin pain with passive flexion of the LLE. No pelvic pain to pelvic rocking. No gluteal pain on exam. Active flexion of the LLE instils pain and weakness (apprehension) of the LLE. No ecchymosis or edema noted. Muscle strength present. XR Results (most recent):  No results found for this or any previous visit. Assessment/Plan:     Left adductor longus/brevis strain vs pubic fx    XR of pelvis and L hip pending. Up with PT WBAT. Dr. Liana Venegas agrees with plan.     Megan Ramos PA-C    Orthopaedic Surgery PA

## 2018-12-18 NOTE — PROGRESS NOTES
2315:  Shift report received from ROBSON Olivas RN. RN assumes care of patient at this time. RN's at bedside for report, patient stating she would like to ambulate to void. RN's assisting patient to bathroom. She is unable to bear weight on left leg due to pain and popping sensation in her groin/hip area. She states she doesn't have pain she she abducts or adducts leg but when she lifts to extend or flex it is very painful. Patient medicated, see MAR.      0215:  Shift report given to Raven Mendoza RN.      0290:  RN resumes care of patient from Raven Mendoza RN.    0700: Bedside and Verbal shift change report given to MAGALIS Guevara RN (oncoming nurse) by Zoe eHadley RN (offgoing nurse). Report included the following information SBAR, Kardex, Intake/Output, MAR, Accordion and Recent Results.

## 2018-12-18 NOTE — PROGRESS NOTES
1900 Assumed care of pt at this time from [address] [place] [address], Minerva Navas RN.    2000 Pt complaint of pain in left groin area, exacerbated by movement. Patient able to move leg, but states 8/10 pain when she does. 2002 Pt on bedpan attempting to void as she states she is unsure if she can ambulate to bathroom due to left groin pain. 2015 Pt unable to void on bedpan. 2020 Pt up to bathroom via wheelchair. Pt states she feels like she needs to have BM. Pt voided, but not into hat. 2045 Pt returned back to bed via wheelchair. States feeling dizzy. Pt lost consciousness while in wheelchair. Emergency cord pulled. Judson Avendano RN and ROBSON Avendano arrived. 2048 Pt back to bed. HOB lowered. IVF bolus started. 2115 Pt reports feeling much better, no longer dizzy. 2315 Bedside shift change report given to elizabeth Headley RN (oncoming nurse) by ROBSON Giles RN (offgoing nurse). Report included the following information SBAR, Kardex, Intake/Output, MAR, Recent Results and Med Rec Status.

## 2018-12-18 NOTE — PROGRESS NOTES
3637 - Patient in bed resting, reporting left hip pain with movement. Medication given, see MAR. Patient set up with k-pad heating pad to help with hip soreness. 8421 - Patient called to void. Attempted to ambulate to bathroom, patient can bear some weight on left leg but cannot step to ambulate, patient reports hip joint feels like it's \"popping out\" when she goes to take a step. Patient taken to bathroom via wheelchair. Patient reported pain and issue to MD when rounding and MD stated patient may need some physical therapy. Nancy Gonzalez. Spoke with her regarding patient inability to take a step, provider order PT consult at this time and if PT thinks ortho needs to be consulted we will do that. 1015 - PT contact on call = ext: [phone] - Called PT, consult is on their list, PT will come evaluate patient. 1130 - Patient taken to bathroom via wheelchair. Patient reports pain is still constant at hip but pain is not the reason patient cannot ambulate, unable to bear weight when lifting to take a step. Patient voiding well and able to transfer from wheelchair to toilet and back to bed without incident, able to stand and steady self with left foot/leg. Requesting norco, given, see MAR.     1245 - Patient ambulating in hallway with PT with the use of a walker. PT feels patient needs ortho consult. [address], PA covering for ortho today per ED. Hospital  will page. 1300 - Spoke with AGUSTÍN Serrano regarding consult, assigned patient to PA via consult, ortho will round [facility] patient[facility], update on patient consults. 1350 - TRANSFER - OUT REPORT:    Verbal report given to Jose Fisher RN (name) on Elissa Kraus  being transferred to MIU (unit) for routine progression of care       Report consisted of patients Situation, Background, Assessment and   Recommendations(SBAR).      Information from the following report(s) SBAR, Procedure Summary, Intake/Output, MAR, Recent Results and Med Rec Status was reviewed with the receiving nurse. Lines:   Peripheral IV 12/17/18 Distal;Inferior; Lower; Posterior;Right Arm (Active)   Site Assessment Clean, dry, & intact 12/18/2018  7:18 AM   Phlebitis Assessment 0 12/18/2018  7:18 AM   Infiltration Assessment 0 12/18/2018  7:18 AM   Dressing Status Clean, dry, & intact 12/18/2018  7:18 AM   Dressing Type Transparent;Tape 12/18/2018  7:18 AM   Hub Color/Line Status Pink;Capped; Patent; Flushed 12/18/2018  7:18 AM   Alcohol Cap Used Yes 12/18/2018  7:18 AM        Opportunity for questions and clarification was provided.       Patient transported with:   Registered Nurse   Infant in [facility]

## 2018-12-18 NOTE — PROGRESS NOTES
Problem: Mobility Impaired (Adult and Pediatric)  Goal: *Acute Goals and Plan of Care (Insert Text)  Physical Therapy Goals  Initiated 12/18/2018  1. Patient will move from supine to sit and sit to supine , scoot up and down and roll side to side in bed with modified independence within 7 day(s). 2.  Patient will transfer from bed to chair and chair to bed with modified independence using the least restrictive device within 7 day(s). 3.  Patient will perform sit to stand with modified independence within 7 day(s). 4.  Patient will ambulate with modified independence for 150 feet with the least restrictive device within 7 day(s). 5.  Patient will ascend/descend 12 stairs with 1 handrail(s) with modified independence within 7 day(s). physical Therapy EVALUATION  Patient: Lindsey Masters (45 y.o. female)  Date: 12/18/2018  Primary Diagnosis: Pregnancy       Precautions:  Fall    ASSESSMENT :  Based on the objective data described below, the patient presents with left sided groin pain and sensation of \"popping\" when she attempts to move LLE . She has been unable to ambulate since vaginal delivery yesterday evening because she reports she is unable to place weight on left LE. Patient's significant other and friend present on arrival. Patient with pain with AROM left hip flexion, abduction, ER and without pain with PROM. High guarding with getting to edge of bed. Pain with attempted FWB on LLE. Able to ambulate 120' but only with RW, CGA. She is unable to ambulate and WB on LLE without RW. Recommend ortho consult. Suspect possible pubic symphysis separation or flexor tendons or ligamentous strain. Spoke with Solo Butlerman hours after PT eval and xray of pelvis has been ordered. Patient will likely need RW for home but will delay ordering until reassess her status with PT tomorrow am. May need OT consult if patient discharging on RW.     Patient will benefit from skilled intervention to address the above impairments. Patients rehabilitation potential is considered to be Good  Factors which may influence rehabilitation potential include:   []         None noted  []         Mental ability/status  []         Medical condition  []         Home/family situation and support systems  []         Safety awareness  [x]         Pain tolerance/management  []         Other:      PLAN :  Recommendations and Planned Interventions:  [x]           Bed Mobility Training             []    Neuromuscular Re-Education  [x]           Transfer Training                   []    Orthotic/Prosthetic Training  [x]           Gait Training                         []    Modalities  [x]           Therapeutic Exercises           []    Edema Management/Control  [x]           Therapeutic Activities            [x]    Patient and Family Training/Education  []           Other (comment):    Frequency/Duration: Patient will be followed by physical therapy  5 times a week to address goals. Discharge Recommendations: To Be Determined  Further Equipment Recommendations for Discharge:possible  rolling walker?      SUBJECTIVE:   Patient stated I feel a pop every time I go to really put weight through my LLE.    OBJECTIVE DATA SUMMARY:   HISTORY:    Past Medical History:   Diagnosis Date    HPV vaccine counseling     Never screened     Routine Papanicolaou smear 2018    Negative (no hpv)     Vitamin D deficiency 2018     Past Surgical History:   Procedure Laterality Date    HX OTHER SURGICAL      wisdom teeth at 22yo     Prior Level of Function/Home Situation:   Personal factors and/or comorbidities impacting plan of care: Has  to care for     Home Situation  Home Environment: Private residence  # Steps to Enter: 3  Rails to Enter: Yes  Hand Rails : Bilateral  One/Two Story Residence: Two story  Living Alone: No  Support Systems: Family member(s), Spouse/Significant Other/Partner  Patient Expects to be Discharged to[de-identified] Private residence  Current DME Used/Available at Home: None  Tub or Shower Type: Tub/Shower combination    EXAMINATION/PRESENTATION/DECISION MAKING:   Critical Behavior:  Alert and oriented x 4  Follows commands              Hearing: Auditory  Auditory Impairment: None    Range Of Motion:  AROM: Generally decreased, functional(left hip flexion and abduction)           PROM: Within functional limits           Strength:    Strength: Generally decreased, functional(decreased left hip flex, ext, abd and ER)                    Tone & Sensation:   Tone: Normal              Sensation: Intact               Coordination:  Coordination: Grossly decreased, non-functional  Vision:      Functional Mobility:  Bed Mobility:  Rolling: Minimum assistance  Supine to Sit: Moderate assistance     Scooting: Modified independent; Additional time  Transfers:  Sit to Stand: Additional time  Stand to Sit: Additional time  Stand Pivot Transfers: Additional time                    Balance:   Sitting: Intact; Without support  Standing: Intact; With support  Ambulation/Gait Training:  Distance (ft): 120 Feet (ft)  Assistive Device: Gait belt;Walker, rolling  Ambulation - Level of Assistance: Contact guard assistance(CG RW, Unable to ambulate without device)        Gait Abnormalities: Antalgic;Decreased step clearance; Step to gait     Left Side Weight Bearing: As tolerated(guarded , unable to keep foot flat with amb with RW)  Base of Support: Shift to right     Speed/Jordana: Pace decreased (<100 feet/min)          Stairs - Level of Assistance: (NT)    Functional Measure:  Barthel Index:    Bathin  Bladder: 10  Bowels: 10  Groomin  Dressin  Feeding: 10  Mobility: 10  Stairs: 0  Toilet Use: 5  Transfer (Bed to Chair and Back): 10  Total: 65       Barthel and G-code impairment scale:  Percentage of impairment CH  0% CI  1-19% CJ  20-39% CK  40-59% CL  60-79% CM  80-99% CN  100%   Barthel Score 0-100 100 99-80 79-60 59-40 20-39 1-19   0   Barthel Score 0-20 20 17-19 13-16 9-12 5-8 1-4 0      The Barthel ADL Index: Guidelines  1. The index should be used as a record of what a patient does, not as a record of what a patient could do. 2. The main aim is to establish degree of independence from any help, physical or verbal, however minor and for whatever reason. 3. The need for supervision renders the patient not independent. 4. A patient's performance should be established using the best available evidence. Asking the patient, friends/relatives and nurses are the usual sources, but direct observation and common sense are also important. However direct testing is not needed. 5. Usually the patient's performance over the preceding 24-48 hours is important, but occasionally longer periods will be relevant. 6. Middle categories imply that the patient supplies over 50 per cent of the effort. 7. Use of aids to be independent is allowed. Milli Tiwari., Barthel, D.W. (4332). Functional evaluation: the Barthel Index. [facility] (14)2. Dario Kelly aleksandr DONAVON Ricci, Talia Joiner., Odalys Dominguez., [place], [address] (1999). Measuring the change indisability after inpatient rehabilitation; comparison of the responsiveness of the Barthel Index and Functional [place] Measure. Journal of Neurology, Neurosurgery, and Psychiatry, 66(4), 224-866. RODDY Morris, RIN Flores, & Marzena Toledo, MMARIA C. (2004.) Assessment of post-stroke quality of life in cost-effectiveness studies: The usefulness of the Barthel Index and the EuroQoL-5D. Quality of Life Research, 13, 158-12       G codes: In compliance with CMSs Claims Based Outcome Reporting, the following G-code set was chosen for this patient based on their primary functional limitation being treated: The outcome measure chosen to determine the severity of the functional limitation was the barthel Index with a score of 65/100 which was correlated with the impairment scale.     ? Mobility - Walking and Moving Around:     - CURRENT STATUS: CJ - 20%-39% impaired, limited or restricted    - GOAL STATUS: CI - 1%-19% impaired, limited or restricted    - D/C STATUS:  ---------------To be determined---------------      Physical Therapy Evaluation Charge Determination   History Examination Presentation Decision-Making   HIGH Complexity :3+ comorbidities / personal factors will impact the outcome/ POC  LOW Complexity : 1-2 Standardized tests and measures addressing body structure, function, activity limitation and / or participation in recreation  LOW Complexity : Stable, uncomplicated  Other outcome measures Barthel Index  LOW       Based on the above components, the patient evaluation is determined to be of the following complexity level: LOW     Pain:  Pain Scale 1: Numeric (0 - 10)  Pain Intensity 1: 7  Pain Location 1: Hip  Pain Orientation 1: Left  Pain Description 1: Aching;Sore; Other (comment)(stiff)  Pain Intervention(s) 1: Medication (see MAR); Repositioned  Activity Tolerance:   Good 98% and HR 110bpm following walk  Please refer to the flowsheet for vital signs taken during this treatment. After treatment:   [x]         Patient left in no apparent distress sitting up in chair  []         Patient left in no apparent distress in bed  [x]         Call bell left within reach  [x]         Nursing notified  [x]         Caregiver present  []         Bed alarm activated    COMMUNICATION/EDUCATION:   The patients plan of care was discussed with: Registered Nurse. [x]         Fall prevention education was provided and the patient/caregiver indicated understanding. [x]         Patient/family have participated as able in goal setting and plan of care. []         Patient/family agree to work toward stated goals and plan of care. []         Patient understands intent and goals of therapy, but is neutral about his/her participation.   []         Patient is unable to participate in goal setting and plan of care.    Thank you for this referral.  Howard Brumfield, PT   Time Calculation: 22 mins

## 2018-12-18 NOTE — PROGRESS NOTES
Post-Partum Day Number 1 Progress Note      C/o left hip/groin pain otherwise doing well. Did not have epidural.  Baby wi=8#15. Pain makes it difficult to walk, using wheelchair to get to bathroom. Pain medication and heating pad are helping. She is voiding without difficulty, she reports normal lochia. Her pain is well controlled with oral pain medication. She is tolerating general diet. Vitals:    Patient Vitals for the past 8 hrs:   BP Temp Pulse Resp   18 0718 113/59 98.2 °F (36.8 °C) (!) 107 14     Temp (24hrs), Av.1 °F (36.7 °C), Min:97.9 °F (36.6 °C), Max:98.2 °F (36.8 °C)        Exam:  Patient without distress. Lungs: CTA bilaterally               CV: regular rate/rhythm, no rubs or gallops, no murmur               Abdomen soft, nontender, nondistended, normal bowel sounds               Uterus: fundus firm at level of umbilicus, nontender               Lower extremities are negative for cords or tenderness, no swelling. Labs: No results found for this or any previous visit (from the past 24 hour(s)). Lab Results   Component Value Date/Time    Rubella, External Immune 2018    GrBStrep, External Positive  2018    HBsAg, External Negative  2018    HIV, External Negative  2018    Gonorrhea, External Negative  2018    Chlamydia, External Negative  2018       Assessment and Plan:   Postpartum Day #1 S/P . Left/hip pain   - routine care   - cont pain meds/heating pad for hip pain.   If not improving, consider PT consult

## 2018-12-18 NOTE — LACTATION NOTE
Angeles Arnav   Lactation Consultant      Progress Notes   Signed   Date of Service:  12/18/18 2404                  Problem: Lactation Care Plan  Goal: *Infant latching appropriately  Outcome: Progressing Towards Goal  Pt will successfully establish breastfeeding by feeding in response to infant's early feeding cues and/or to offer breast every 2-3 hours. Ways to obtain a deep latch and seek comfortable positioning shared, aware to keep log of feedings/output. Goal: *Weight loss less than 10% of birth weight  Outcome: Progressing Towards Goal     Encouraged mom to attempt feeding with baby led feeding cues. Just as sucking on fingers, rooting, mouthing. Looking for 8-12 feedings in 24 hours. Don't limit baby at breast, allow baby to come of breast on it's own. Baby may want to feed  often and may increase number of feedings on second day of life. Skin to skin encouraged.       If baby doesn't nurse,  Mom should  hand express  10-20 drops of colostrum and drip into baby's mouth, or give to baby by finger feeding, cup feeding, or spoon feeding at least every 2-3 hours.      Problem: Patient Education: Go to Patient Education Activity  Goal: Patient/Family Education  Outcome: Progressing Towards Goal  Discussed with mother her plan for feeding. Reviewed the benefits of exclusive breast milk feeding during the hospital stay. Informed her of the risks of using formula to supplement in the first few days of life as well as the benefits of successful breast milk feeding; referred her to the Breastfeeding booklet about this information. She acknowledges understanding of information reviewed and states that it is her plan to breastfeed her infant. Will support her choice and offer additional information as needed.      Hand Expression Education:  Mom taught how to manually hand express her colostrum.   Emphasized the importance of providing infant with valuable colostrum as infant rests skin to skin at breast. Aware to avoid extended periods of non-feeding. Aware to offer 10-20+ drops of colostrum every 2-3 hours until infant is latching and nursing effectively. Taught the rationale behind this low tech but highly effective evidence based practice.     Comments: Pt will successfully establish breastfeeding by feeding in response to early feeding cues   or wake every 3h, will obtain deep latch, and will keep log of feedings/output.   Taught to BF at hunger cues and or q 2-3 hrs and to offer 10-20 drops of hand expressed colostrum at any non-feeds.       Breast Assessment  Left Breast: Medium  Left Nipple: Everted, Intact  Right Breast: Medium  Right Nipple: Everted, Intact  Breast- Feeding Assessment  Attends Breast-Feeding Classes: Yes  Breast-Feeding Experience: No  Breast Trauma/Surgery: No  Type/Quality: Good  Lactation Consultant Visits  Breast-Feedings: Good (Baby was latched onto left breast well and was breastfeeding well. )  Mother/Infant Observation  Mother Observation: Alignment, Breast comfortable, Close hold, Holds breast, Recognizes feeding cues  Infant Observation: Audible swallows, Latches nipple and aereolae, Lips flanged, upper, Breast tissue moves, Lips flanged, lower, Opens mouth, Rhythmic suck  LATCH Documentation  Latch: Grasps breast, tongue down, lips flanged, rhythmic sucking  Audible Swallowing: A few with stimulation  Type of Nipple: Everted (after stimulation)  Comfort (Breast/Nipple): Soft/non-tender  Hold (Positioning): No assist from staff, mother able to position/hold infant  LATCH Score: 9

## 2018-12-18 NOTE — PROGRESS NOTES
0200 pt up to br to void, unable to bear weight on Left leg due to \"pain and popping in my pelvis\", pt placed in wheelchair, able to transfer to void, pericare done, peripad changed, pt transferred back to wheelchair, taken back to bed, transferred to bed and readjusted for comfort, denies any other needs at this time.

## 2018-12-18 NOTE — ROUTINE PROCESS
0345: patient called out requestiong a new ice pack. Assisted to standing and michael ice packs swapped. Patient states that her left hip is beginning to feel better. 0445: patient sleeping, no signs of distress present, will continue to monitor. 0600: patient sleeping, no signs of distress present, will continue to monitor.

## 2018-12-19 LAB
ABO + RH BLD: NORMAL
BLD PROD TYP BPU: NORMAL
BPU ID: NORMAL
FETAL SCREEN,FMHS: NORMAL
STATUS OF UNIT,%ST: NORMAL
UNIT DIVISION, %UDIV: 0
WEAK D AG RBC QL: NORMAL

## 2018-12-19 PROCEDURE — 97116 GAIT TRAINING THERAPY: CPT

## 2018-12-19 PROCEDURE — 74011250637 HC RX REV CODE- 250/637: Performed by: OBSTETRICS & GYNECOLOGY

## 2018-12-19 RX ORDER — HYDROCODONE BITARTRATE AND ACETAMINOPHEN 5; 325 MG/1; MG/1
1 TABLET ORAL
Qty: 10 TAB | Refills: 0 | Status: SHIPPED | OUTPATIENT
Start: 2018-12-19 | End: 2019-01-24

## 2018-12-19 RX ADMIN — DOCUSATE SODIUM 100 MG: 100 CAPSULE, LIQUID FILLED ORAL at 07:38

## 2018-12-19 RX ADMIN — HYDROCODONE BITARTRATE AND ACETAMINOPHEN 1 TABLET: 5; 325 TABLET ORAL at 07:38

## 2018-12-19 RX ADMIN — HYDROCODONE BITARTRATE AND ACETAMINOPHEN 1 TABLET: 5; 325 TABLET ORAL at 12:05

## 2018-12-19 RX ADMIN — HYDROCODONE BITARTRATE AND ACETAMINOPHEN 1 TABLET: 5; 325 TABLET ORAL at 03:51

## 2018-12-19 RX ADMIN — IBUPROFEN 800 MG: 800 TABLET ORAL at 07:38

## 2018-12-19 NOTE — PROGRESS NOTES
4:08 PM  Patient discharged to home. Discharge instructions and education completed and patient reported she had no more questions. Bands verified on patient and infant, see footprint sheet. Infant placed in car seat by parent. Per NP, patient is to follow up with Dr. Juanda Holter in 2 weeks. Patient verbalizes understanding and will call to schedule appointment.       Prescriptions:     Norco

## 2018-12-19 NOTE — PROGRESS NOTES
Orthopaedic Progress Note  Post Op day: * No surgery found *    2018 11:09 AM     Patient: Paco Gonzales MRN: [id number]  SSN: [id number]    YOB: 1995  Age: 26 y.o. Sex: female      Admit date:  2018  Date of Surgery:  * No surgery found *   Procedures:    Admitting Physician:  Sherrie Ryan MD   Surgeon:  * Surgery not found *    Consulting Physician(s): Treatment Team: Attending Provider: Wes Martinez MD; Utilization Review: Efrain Hernandez RN; Consulting Provider: Iván Alonzo; Surgeon: Leonard Edwards MD    SUBJECTIVE:     Paco Gonzales is a 26 y.o. female resting in bed with minimal pain at rest. When patient is up walking with walker and able to tolerate it well. Xrays reviewed pubic symphysis avulsion fracture. PT had seen patient yesterday and she worked with PT and progressed well. OBJECTIVE:       Physical Exam:  General: Alert, cooperative, no distress. Respiratory: Respirations unlabored  Neurological:  Neurovascular exam within normal limits. Motor: + DF/PF. Musculoskeletal: Calves soft, supple, non-tender upon palpation. Vital Signs:      No data found.                                        Temp (24hrs), Av.4 °F (36.9 °C), Min:98.4 °F (36.9 °C), Max:98.4 °F (36.9 °C)      Labs:        Recent Labs     18  0600   HCT 36.2   HGB 11.6     Lab Results   Component Value Date/Time    Sodium 140 2018 08:06 AM    Potassium 3.5 2018 08:06 AM    Chloride 105 2018 08:06 AM    CO2 22 2018 08:06 AM    Glucose 94 2018 08:06 AM    BUN 8 2018 08:06 AM    Creatinine 0.74 2018 08:06 AM    Calcium 9.3 2018 08:06 AM       PT/OT:        Gait  Base of Support: Shift to right  Speed/Jordana: Pace decreased (<100 feet/min)  Gait Abnormalities: Antalgic, Decreased step clearance, Step to gait  Ambulation - Level of Assistance: Contact guard assistance(CG RW, Unable to ambulate without device)  Distance (ft): 120 Feet (ft)  Assistive Device: Gait belt, Walker, rolling  Stairs - Level of Assistance: (NT)       Patient mobility  Bed Mobility Training  Rolling: Minimum assistance  Supine to Sit: Moderate assistance  Scooting: Modified independent, Additional time  Transfer Training  Sit to Stand: Additional time  Stand to Sit: Additional time      Gait Training  Assistive Device: Gait belt, Walker, rolling  Ambulation - Level of Assistance: Contact guard assistance(CG RW, Unable to ambulate without device)  Distance (ft): 120 Feet (ft)  Stairs - Level of Assistance: (NT)   Weight Bearing Status  Left Side Weight Bearing: As tolerated(guarded , unable to keep foot flat with amb with RW)         EXAM: XR HIP LT W OR WO PELV 2-3 VWS     INDICATION: L groin and pubic pain.     COMPARISON: None.     FINDINGS: An AP view of the pelvis and a frogleg lateral view of the left hip  demonstrate widening of the pubic symphysis with small bony fragments adjacent  to left parasymphyseal pubic bone. Question widening of the left SI joint. Alignment of the hips is normal without joint space loss or fracture. .     IMPRESSION  IMPRESSION:   1. Widening of the pubic symphysis and possible widening of the left SI joint. Bony fragments adjacent to the left parasymphyseal pubic bone. Correlate for  history of trauma and pelvic instability. ASSESSMENT / PLAN:   Active Problems:    Pregnancy (6/25/2018)      Overview: - transfer OB @ 12wks      - U=D -> SHENA=12/9/18      - declines aneuploidy/carrier/AFP       - Rh NEGATIVE -> Rhogam @ 28wks (9/17)      - US (7/24/18) 19+4 @ 20+2. Nl morph. Low-lying post placenta (1.5-1.8cm       from os)      - US (10/15/18) 32+5 @ 32+1. 1987gm (52%). GHADA=19. Post placenta, not       low-lying. Vtx.      - hgb (9/17)=10.4 -> OTC iron once daily -> (10/29 34wks)=10.8      - IOL (12/17/18). Pt notified. Pubic symphysis avulsion fx:    WBAT with rolling walker.  Pt cleared for discharge home when cleared by PT/ OT    Pt to follow up with Dr. Henrik Nazario in 2 weeks. To call and make appt. Pain medications as needed for pain control. Discussed with Dr. Henrik Nazario, he agrees with above plan.                  Signed By:  Charlene Canas NP    Orthopedic Trauma Nurse Practitioner  [address] [place] [address]

## 2018-12-19 NOTE — PROGRESS NOTES
12/19/18 1:00 PM  MOB accepted by [facility] for RW.  RW taken from [facility] closet and delivered to MOB's room. 12/19/18 12:25 PM  CM met with MOB and her /FOB Emeka Christianson ([phone]) to complete initial assessment and to begin discharge planning. Demographics were reviewed and confirmed; MOB and FOB live together at the address listed and this is their first baby. They live in a 2nd floor apartment with 12 steps to reach. Once inside the apartment, everything is on 1 level. MOB works at [employer] and will have 6 weeks off from work; FOB works and will return to work in 2 weeks. FOB's family is local and will be available to assist as needed during the postpartum period. [facility] will provide medical follow up for the baby. Patient has car seat, crib, clothing, and other necessary supplies. MOB is breastfeeding and has a pump to use at home. MOB does not have a PCP, CM provided a list of [facility] providers. MOB stated that she plans to follow up with Dr. Martinez Aguirre at 6 weeks. MOB drives herself and FOB can also drive until MOB feels comfortable. Consult noted for RW; referral sent in [facility] to [facility] per patient choice. Per PT, outpatient PT not needed at this time as it would aggravate the injury. Outpatient PT can be ordered during 6 week follow up if necessary. Plan for discharge home today. FOB to drive home at discharge. Care Management Interventions  PCP Verified by CM:  Yes  Mode of Transport at Discharge: Self  Transition of Care Consult (CM Consult): Discharge Planning  Discharge Durable Medical Equipment: Yes(RW)  Physical Therapy Consult: Yes  Occupational Therapy Consult: Yes  Current Support Network: Lives with Spouse, Family Lives Nearby  Confirm Follow Up Transport: Family  Plan discussed with Pt/Family/Caregiver: Yes  Discharge Location  Discharge Placement: Home with outpatient services  AZAR Calles

## 2018-12-19 NOTE — LACTATION NOTE
This note was copied from a baby's chart. Mother and baby for discharge. LC discussed the following:    Reviewed breastfeeding basics:  Supply and demand, breastfeed baby 8-12 times in 24 hr.,   stomach size, early  Feeding cues, skin to skin, positioning and baby led latch-on, assymetrical latch with signs of good, deep latch vs shallow, feeding frequency and duration, and log sheet for tracking infant feedings and output. Breastfeeding Booklet and Warm line information given. Discussed typical  weight loss and the importance of infant weight checks with pediatrician 1-2 post discharge. Baby has a pediatric appt. Tomorrow. Engorgement Care Guidelines:  Reviewed how milk is made and normal phases of milk production. Taught care of engorged breasts - frequent breastfeeding encouraged, cool packs and motrin as tolerated. Anticipatory guidance shared. Care for sore/tender nipples discussed:  ways to improve positioning and latch practiced and discussed, hand express colostrum after feedings and let air dry, light application of lanolin, hydrogel pads, seek comfortable laid back feeding position, start feedings on least sore side first.    Discussed eating a healthy diet. Instructed mother to eat a variety of foods in order to get a well balanced diet. She should consume an extra 500 calories per day (more than her non-pregnant requirement.) These extra calories will help provide energy needed for optimal breast milk production. Mother also encouraged to \"drink to thirst\" and it is recommended that she drink fluids such as water, fruit/vegetable juice. Nutritious snacks should be available so that she can eat throughout the day to help satisfy her hunger and maintain a good milk supply. Chart shows numerous feedings, void, stool WNL. Discussed importance of monitoring outputs and feedings on first week of life.   Discussed ways to tell if baby is  getting enough breast milk, ie  voids and stools, change in color of stool, and return to birth wt within 2 weeks. Follow up with pediatrician visit for weight check in 1-2 days (per AAP guidelines.)  Encouraged to call Warm Line  [phone] or [facility] at [phone] for any questions/problems that arise. Mother also given breastfeeding support group dates and times for any future needs    Pt will successfully establish breastfeeding by feeding in response to early feeding cues   or wake every 3h, will obtain deep latch, and will keep log of feedings/output. Taught to BF at hunger cues and or q 2-3 hrs and to offer 10-20 drops of hand expressed colostrum at any non-feeds. Breast Assessment  Left Breast: Medium  Left Nipple: Everted, Intact  Right Breast: Medium  Right Nipple: Everted, Intact  Breast- Feeding Assessment  Attends Breast-Feeding Classes: Yes  Breast-Feeding Experience: No  Breast Trauma/Surgery: No  Type/Quality: Good  Lactation Consultant Visits  Breast-Feedings: (Mother states baby last breast fed well at 0730 for 30 min. She and baby for D/C.  Mother to call [phone] if she feeds again prior to D/C.)  Mother/Infant Observation  Mother Observation: Alignment, Holds breast(Per mother)  Infant Observation: Audible swallows(Per mother)  LATCH Documentation  Latch: Grasps breast, tongue down, lips flanged, rhythmic sucking(Per mother)  Audible Swallowing: A few with stimulation(Per mother)  Type of Nipple: Everted (after stimulation)  Comfort (Breast/Nipple): Soft/non-tender(Per mother)  Hold (Positioning): No assist from staff, mother able to position/hold infant(Per mother)  [facility] Score: 9

## 2018-12-19 NOTE — ROUTINE PROCESS
Bedside and Verbal shift change report given to Judd Sargent RN (oncoming nurse) by Cristopher Gilman RN (offgoing nurse). Report included the following information SBAR, Kardex, Intake/Output, MAR and Recent Results.

## 2018-12-19 NOTE — PROGRESS NOTES
Post-Partum Day Number 2 Progress/Discharge Note    Patient doing well post-partum from OB standpoint. Has seen PT and ortho for symphysis diastasis. Able to ambulate with walker. Taking Norco and motrin. Per pt, she is to f/u with ortho in 2wks, but do not see any ortho notes indicating this. She is voiding without difficulty, she reports normal lochia. She is ambulatiing without dizziness. Her pain is well controlled with oral pain medication. She is tolerating general diet. Breast feeding. Vitals:  No data found. Temp (24hrs), Av.4 °F (36.9 °C), Min:98.4 °F (36.9 °C), Max:98.4 °F (36.9 °C)        Exam:  Patient without distress. Lungs:  CTA bilaterally               CV: RRR with no rubs or gallops; no murmur               Abdomen soft, fundus firm at level of umbilicus-1/-2, nontender               Lower extremities are negative for cords or tenderness; 0-tr swelling. Labs: No results found for this or any previous visit (from the past 24 hour(s)). Lab Results   Component Value Date/Time    Rubella, External Immune 2018    GrBStrep, External Positive  2018    HBsAg, External Negative  2018    HIV, External Negative  2018    Gonorrhea, External Negative  2018    Chlamydia, External Negative  2018       Assessment and Plan:    Postpartum Day #2, S/P . Doing well. Symphysis diastasis.    - d/c home   - F/U 6wk postpartum check, sooner prn   - Norco 5/325 #10  - f/u with ortho per their instructions

## 2018-12-24 NOTE — DISCHARGE SUMMARY
Obstetrical Discharge Summary     Name: Elissa Kraus MRN: [id number]  SSN: [id number]    YOB: 1995  Age: 26 y.o. Sex: female      Admit Date: 2018    Discharge Date: 2018  4:05 PM    Admitting Physician: Lm Vazquez MD     Attending Physician:  No att. providers found     Admission Diagnoses: Pregnancy    Procedures for this admission:     Discharge Diagnoses:   Information for the patient's :  Andre Vee, Male [[id number]]   Delivery of a 8 lb 14.7 oz (4.045 kg) male infant via Vaginal, Spontaneous on 2018 at 5:10 PM  by . Apgars were 9 and 9. Discharge Condition: good    Discharge disposition: Home. Nonsteroidals    Hospital Course: . Left hip pain with weight bearing postpartum. Evaluated by PT and ortho. Xray showed pubic symphysis avultion fracture. Able to ambulate with walker. Patient Instructions:   Cannot display discharge medications since this patient is not currently admitted. Reference my discharge instructions. Follow-up Appointments   Procedures    FOLLOW UP VISIT Appointment in: 6 Weeks     Standing Status:   Standing     Number of Occurrences:   1     Order Specific Question:   Appointment in     Answer:   6 Weeks      Will follow-up with ortho in 2 wks.     Signed By:  Lm Vazquez MD     2018

## 2018-12-26 ENCOUNTER — TELEPHONE (OUTPATIENT)
Dept: OBGYN CLINIC | Age: 23
End: 2018-12-26

## 2018-12-26 NOTE — TELEPHONE ENCOUNTER
Pt is calling to see if her 's [facility] papers have been faxed yet or not.   Please call and let her know [phone]

## 2019-01-24 ENCOUNTER — OFFICE VISIT (OUTPATIENT)
Dept: OBGYN CLINIC | Age: 24
End: 2019-01-24

## 2019-01-24 VITALS
BODY MASS INDEX: 22.6 KG/M2 | HEART RATE: 100 BPM | HEIGHT: 67 IN | SYSTOLIC BLOOD PRESSURE: 111 MMHG | WEIGHT: 144 LBS | DIASTOLIC BLOOD PRESSURE: 83 MMHG

## 2019-01-24 RX ORDER — CALCIUM ACETATE 667 MG/1
CAPSULE ORAL
COMMUNITY

## 2019-01-24 NOTE — PROGRESS NOTES
Postpartum evaluation Adis Solo is a 26 y.o. female who presents for a postpartum exam.  
 
She is now six weeks post normal spontaneous vaginal delivery on 12/17/18, baby boy. Her baby is doing well. She has had no menses since delivery. Has not been sexually active. She has had the following significant problems since her delivery: pubic diastasis. Still having some discomfort. Has seen ortho - per pt advised to take calcium, no PT needed. Feels pelvic floor is a little week with voiding. The patient is breastfeeding without difficulty, would like to switch to formula soon. The patient would like to use birth control, discuss options. Has been on OCPs in past, not sure what she took. Had problems with acne. She is currently taking: PNVs + Calcium. She is due for her next AE in in May 2019. Visit Vitals /83 Pulse 100 Ht 5' 7\" (1.702 m) Wt 144 lb (65.3 kg) Breastfeeding? Yes  
BMI 22.55 kg/m² PHYSICAL EXAMINATION Constitutional 
· Appearance: well-nourished, well developed, alert, in no acute distress HENT 
· Head and Face: appears normal 
 
Neck · Inspection/Palpation: normal appearance, no masses or tenderness · Lymph Nodes: no lymphadenopathy present · Thyroid: gland size normal, nontender, no nodules or masses present on palpation Breasts · Inspection of Breasts: breasts symmetrical, no skin changes, no discharge present, nipple appearance normal, no skin retraction present · Palpation of Breasts and Axillae: no masses present on palpation, no breast tenderness · Axillary Lymph Nodes: no lymphadenopathy present Gastrointestinal 
· Abdominal Examination: abdomen non-tender to palpation, normal bowel sounds, no masses present · Liver and spleen: no hepatomegaly present, spleen not palpable · Hernias: no hernias identified Genitourinary · External Genitalia: normal appearance for age, no discharge present, no tenderness present, no inflammatory lesions present, no masses present, no atrophy present · Vagina: normal vaginal vault without central or paravaginal defects, no discharge present, no inflammatory lesions present, no masses present, stitch still visible right sulcus (wiped away with swab) · Bladder: non-tender to palpation · Urethra: appears normal 
· Cervix: normal  
· Uterus: normal size, shape and consistency · Adnexa: no adnexal tenderness present, no adnexal masses present · Perineum: perineum within normal limits, no evidence of trauma, no rashes or skin lesions present · Anus: anus within normal limits, no hemorrhoids present · Inguinal Lymph Nodes: no lymphadenopathy present Skin · General Inspection: no rash, no lesions identified Neurologic/Psychiatric · Mental Status: · Orientation: grossly oriented to person, place and time · Mood and Affect: mood normal, affect appropriate Assessment: 
Normal postpartum check Note given to RTW 1/28/19 C/o pelvic floor weakness with voiding. Plan: RTO for AE.  5/2019. Rx for contraception: LE 24 #3 RFx1. Can begin Sun 1/27 (6wk PP). Risk and benefits reviewed, including thromboembolic events. Backup method until second pack. Handout given. Discussed pelvic floor sx will likely improve a little more over next several weeks.   Offered PT referral.  She will see how things go over next few wks, will let me know if wants referral.

## 2019-01-24 NOTE — PATIENT INSTRUCTIONS
Postpartum: Care Instructions Your Care Instructions After childbirth (postpartum period), your body goes through many changes. Some of these changes happen over several weeks. In the hours after delivery, your body will begin to recover from childbirth while it prepares to breastfeed your . You may feel emotional during this time. Your hormones can shift your mood without warning for no clear reason. In the first couple of weeks after childbirth, many women have emotions that change from happy to sad. You may find it hard to sleep. You may cry a lot. This is called the \"baby blues. \" These overwhelming emotions often go away within a couple of days or weeks. But it's important to discuss your feelings with your doctor. It is easy to get too tired and overwhelmed during the first weeks after childbirth. Don't try to do too much. Get rest whenever you can, accept help from others, and eat well and drink plenty of fluids. About 4 to 6 weeks after your baby's birth, you will have a follow-up visit with your doctor. This visit is your time to talk to your doctor about anything you are concerned or curious about. Follow-up care is a key part of your treatment and safety. Be sure to make and go to all appointments, and call your doctor if you are having problems. It's also a good idea to know your test results and keep a list of the medicines you take. How can you care for yourself at home? · Sleep or rest when your baby sleeps. · Get help with household chores from family or friends, if you can. Do not try to do it all yourself. · If you have hemorrhoids or swelling or pain around the opening of your vagina, try using cold and heat. You can put ice or a cold pack on the area for 10 to 20 minutes at a time. Put a thin cloth between the ice and your skin. Also try sitting in a few inches of warm water (sitz bath) 3 times a day and after bowel movements. · Take pain medicines exactly as directed. ? If the doctor gave you a prescription medicine for pain, take it as prescribed. ? If you are not taking a prescription pain medicine, ask your doctor if you can take an over-the-counter medicine. · Eat more fiber to avoid constipation. Include foods such as whole-grain breads and cereals, raw vegetables, raw and dried fruits, and beans. · Drink plenty of fluids, enough so that your urine is light yellow or clear like water. If you have kidney, heart, or liver disease and have to limit fluids, talk with your doctor before you increase the amount of fluids you drink. · Do not rinse inside your vagina with fluids (douche). · If you have stitches, keep the area clean by pouring or spraying warm water over the area outside your vagina and anus after you use the toilet. · Keep a list of questions to bring to your postpartum visit. Your questions might be about: 
? Changes in your breasts, such as lumps or soreness. ? When to expect your menstrual period to start again. ? What form of birth control is best for you. ? Weight you have put on during the pregnancy. ? Exercise options. ? What foods and drinks are best for you, especially if you are breastfeeding. ? Problems you might be having with breastfeeding. ? When you can have sex. Some women may want to talk about lubricants for the vagina. ? Any feelings of sadness or restlessness that you are having. When should you call for help? Call 911 anytime you think you may need emergency care. For example, call if: 
  · You have thoughts of harming yourself, your baby, or another person.  
  · You passed out (lost consciousness).  
 Call your doctor now or seek immediate medical care if: 
  · Your vaginal bleeding seems to be getting heavier.  
  · You are dizzy or lightheaded, or you feel like you may faint.  
  · You have a fever.  
 Watch closely for changes in your health, and be sure to contact your doctor if:   · You have new or worse vaginal discharge.  
  · You feel sad or depressed.  
  · You are having problems with your breasts or breastfeeding. Where can you learn more? Go to http://reji-yuan.info/. Enter K164 in the search box to learn more about \"Postpartum: Care Instructions. \" Current as of: September 5, 2018 Content Version: 11.9 © 3985-7490 [employer]. Care instructions adapted under license by [employer] (which disclaims liability or warranty for this information). If you have questions about a medical condition or this instruction, always ask your healthcare professional. [place] [address] any warranty or liability for your use of this information.

## 2019-05-30 VITALS — HEIGHT: 67 IN | BODY MASS INDEX: 22.55 KG/M2

## 2019-05-30 NOTE — PATIENT INSTRUCTIONS

## 2019-06-03 ENCOUNTER — OFFICE VISIT (OUTPATIENT)
Dept: OBGYN CLINIC | Age: 24
End: 2019-06-03

## 2019-06-03 DIAGNOSIS — Z11.3 SCREENING EXAMINATION FOR VENEREAL DISEASE: ICD-10-CM

## 2019-06-03 DIAGNOSIS — Z01.419 ENCOUNTER FOR WELL WOMAN EXAM WITH ROUTINE GYNECOLOGICAL EXAM: Primary | ICD-10-CM
